# Patient Record
Sex: FEMALE | Employment: UNEMPLOYED | URBAN - METROPOLITAN AREA
[De-identification: names, ages, dates, MRNs, and addresses within clinical notes are randomized per-mention and may not be internally consistent; named-entity substitution may affect disease eponyms.]

---

## 2017-03-30 ENCOUNTER — DOCUMENTATION ONLY (OUTPATIENT)
Dept: FAMILY MEDICINE CLINIC | Age: 62
End: 2017-03-30

## 2018-01-01 ENCOUNTER — TELEPHONE (OUTPATIENT)
Dept: PALLATIVE CARE | Age: 63
End: 2018-01-01

## 2018-01-01 ENCOUNTER — HOSPITAL ENCOUNTER (INPATIENT)
Age: 63
LOS: 8 days | Discharge: NURSING, REHAB OR LONG-TERM CARE | DRG: 460 | End: 2018-11-20
Attending: EMERGENCY MEDICINE | Admitting: INTERNAL MEDICINE
Payer: MEDICAID

## 2018-01-01 VITALS
RESPIRATION RATE: 18 BRPM | OXYGEN SATURATION: 100 % | HEIGHT: 64 IN | WEIGHT: 139.99 LBS | BODY MASS INDEX: 23.9 KG/M2 | HEART RATE: 74 BPM | SYSTOLIC BLOOD PRESSURE: 148 MMHG | TEMPERATURE: 98.5 F | DIASTOLIC BLOOD PRESSURE: 62 MMHG

## 2018-01-01 DIAGNOSIS — N18.4 STAGE 4 CHRONIC KIDNEY DISEASE (HCC): ICD-10-CM

## 2018-01-01 DIAGNOSIS — Z71.89 GOALS OF CARE, COUNSELING/DISCUSSION: ICD-10-CM

## 2018-01-01 DIAGNOSIS — R53.81 DEBILITY: ICD-10-CM

## 2018-01-01 DIAGNOSIS — N17.9 ACUTE RENAL FAILURE, UNSPECIFIED ACUTE RENAL FAILURE TYPE (HCC): Primary | ICD-10-CM

## 2018-01-01 LAB
ALBUMIN SERPL ELPH-MCNC: 3.1 G/DL (ref 2.9–4.4)
ALBUMIN SERPL-MCNC: 2.7 G/DL (ref 3.5–5)
ALBUMIN SERPL-MCNC: 2.7 G/DL (ref 3.5–5)
ALBUMIN SERPL-MCNC: 2.8 G/DL (ref 3.5–5)
ALBUMIN SERPL-MCNC: 2.9 G/DL (ref 3.5–5)
ALBUMIN SERPL-MCNC: 3.1 G/DL (ref 3.5–5)
ALBUMIN/GLOB SERPL: 0.7 {RATIO} (ref 1.1–2.2)
ALBUMIN/GLOB SERPL: 0.8 {RATIO} (ref 1.1–2.2)
ALBUMIN/GLOB SERPL: 0.8 {RATIO} (ref 1.1–2.2)
ALBUMIN/GLOB SERPL: 1 {RATIO} (ref 0.7–1.7)
ALP SERPL-CCNC: 256 U/L (ref 45–117)
ALP SERPL-CCNC: 265 U/L (ref 45–117)
ALP SERPL-CCNC: 299 U/L (ref 45–117)
ALPHA1 GLOB SERPL ELPH-MCNC: 0.2 G/DL (ref 0–0.4)
ALPHA2 GLOB SERPL ELPH-MCNC: 0.8 G/DL (ref 0.4–1)
ALT SERPL-CCNC: 10 U/L (ref 12–78)
ALT SERPL-CCNC: 11 U/L (ref 12–78)
ALT SERPL-CCNC: 9 U/L (ref 12–78)
ANION GAP SERPL CALC-SCNC: 10 MMOL/L (ref 5–15)
ANION GAP SERPL CALC-SCNC: 5 MMOL/L (ref 5–15)
ANION GAP SERPL CALC-SCNC: 7 MMOL/L (ref 5–15)
ANION GAP SERPL CALC-SCNC: 9 MMOL/L (ref 5–15)
APPEARANCE UR: ABNORMAL
AST SERPL-CCNC: 11 U/L (ref 15–37)
AST SERPL-CCNC: 8 U/L (ref 15–37)
AST SERPL-CCNC: 9 U/L (ref 15–37)
B-GLOBULIN SERPL ELPH-MCNC: 0.8 G/DL (ref 0.7–1.3)
BACTERIA SPEC CULT: NORMAL
BACTERIA URNS QL MICRO: NEGATIVE /HPF
BASOPHILS # BLD: 0 K/UL (ref 0–0.1)
BASOPHILS # BLD: 0 K/UL (ref 0–0.1)
BASOPHILS NFR BLD: 0 % (ref 0–1)
BASOPHILS NFR BLD: 1 % (ref 0–1)
BILIRUB SERPL-MCNC: 0.3 MG/DL (ref 0.2–1)
BILIRUB SERPL-MCNC: 0.4 MG/DL (ref 0.2–1)
BILIRUB SERPL-MCNC: 0.4 MG/DL (ref 0.2–1)
BILIRUB UR QL: NEGATIVE
BUN SERPL-MCNC: 53 MG/DL (ref 6–20)
BUN SERPL-MCNC: 53 MG/DL (ref 6–20)
BUN SERPL-MCNC: 54 MG/DL (ref 6–20)
BUN SERPL-MCNC: 55 MG/DL (ref 6–20)
BUN SERPL-MCNC: 57 MG/DL (ref 6–20)
BUN SERPL-MCNC: 57 MG/DL (ref 6–20)
BUN SERPL-MCNC: 60 MG/DL (ref 6–20)
BUN/CREAT SERPL: 7 (ref 12–20)
BUN/CREAT SERPL: 8 (ref 12–20)
CALCIUM SERPL-MCNC: 7.2 MG/DL (ref 8.5–10.1)
CALCIUM SERPL-MCNC: 7.3 MG/DL (ref 8.5–10.1)
CALCIUM SERPL-MCNC: 7.6 MG/DL (ref 8.5–10.1)
CALCIUM SERPL-MCNC: 7.6 MG/DL (ref 8.5–10.1)
CALCIUM SERPL-MCNC: 8 MG/DL (ref 8.5–10.1)
CALCIUM SERPL-MCNC: 8.1 MG/DL (ref 8.5–10.1)
CALCIUM SERPL-MCNC: 8.2 MG/DL (ref 8.5–10.1)
CC UR VC: NORMAL
CHLORIDE SERPL-SCNC: 111 MMOL/L (ref 97–108)
CHLORIDE SERPL-SCNC: 111 MMOL/L (ref 97–108)
CHLORIDE SERPL-SCNC: 112 MMOL/L (ref 97–108)
CHLORIDE SERPL-SCNC: 113 MMOL/L (ref 97–108)
CHLORIDE SERPL-SCNC: 115 MMOL/L (ref 97–108)
CO2 SERPL-SCNC: 19 MMOL/L (ref 21–32)
CO2 SERPL-SCNC: 21 MMOL/L (ref 21–32)
CO2 SERPL-SCNC: 22 MMOL/L (ref 21–32)
CO2 SERPL-SCNC: 22 MMOL/L (ref 21–32)
CO2 SERPL-SCNC: 23 MMOL/L (ref 21–32)
CO2 SERPL-SCNC: 24 MMOL/L (ref 21–32)
CO2 SERPL-SCNC: 27 MMOL/L (ref 21–32)
COLOR UR: ABNORMAL
CREAT SERPL-MCNC: 6.69 MG/DL (ref 0.55–1.02)
CREAT SERPL-MCNC: 6.87 MG/DL (ref 0.55–1.02)
CREAT SERPL-MCNC: 7 MG/DL (ref 0.55–1.02)
CREAT SERPL-MCNC: 7.28 MG/DL (ref 0.55–1.02)
CREAT SERPL-MCNC: 7.37 MG/DL (ref 0.55–1.02)
CREAT SERPL-MCNC: 7.53 MG/DL (ref 0.55–1.02)
CREAT SERPL-MCNC: 7.54 MG/DL (ref 0.55–1.02)
DIFFERENTIAL METHOD BLD: ABNORMAL
DIFFERENTIAL METHOD BLD: ABNORMAL
EOSINOPHIL # BLD: 0 K/UL (ref 0–0.4)
EOSINOPHIL # BLD: 0.1 K/UL (ref 0–0.4)
EOSINOPHIL NFR BLD: 0 % (ref 0–7)
EOSINOPHIL NFR BLD: 2 % (ref 0–7)
EPITH CASTS URNS QL MICRO: ABNORMAL /LPF
ERYTHROCYTE [DISTWIDTH] IN BLOOD BY AUTOMATED COUNT: 13 % (ref 11.5–14.5)
ERYTHROCYTE [DISTWIDTH] IN BLOOD BY AUTOMATED COUNT: 13.2 % (ref 11.5–14.5)
ERYTHROCYTE [DISTWIDTH] IN BLOOD BY AUTOMATED COUNT: 13.2 % (ref 11.5–14.5)
ERYTHROCYTE [DISTWIDTH] IN BLOOD BY AUTOMATED COUNT: 13.4 % (ref 11.5–14.5)
GAMMA GLOB SERPL ELPH-MCNC: 1.3 G/DL (ref 0.4–1.8)
GLOBULIN SER CALC-MCNC: 3.1 G/DL (ref 2.2–3.9)
GLOBULIN SER CALC-MCNC: 3.4 G/DL (ref 2–4)
GLOBULIN SER CALC-MCNC: 3.8 G/DL (ref 2–4)
GLOBULIN SER CALC-MCNC: 4.1 G/DL (ref 2–4)
GLUCOSE BLD STRIP.AUTO-MCNC: 163 MG/DL (ref 65–100)
GLUCOSE SERPL-MCNC: 167 MG/DL (ref 65–100)
GLUCOSE SERPL-MCNC: 71 MG/DL (ref 65–100)
GLUCOSE SERPL-MCNC: 72 MG/DL (ref 65–100)
GLUCOSE SERPL-MCNC: 82 MG/DL (ref 65–100)
GLUCOSE SERPL-MCNC: 82 MG/DL (ref 65–100)
GLUCOSE SERPL-MCNC: 83 MG/DL (ref 65–100)
GLUCOSE SERPL-MCNC: 94 MG/DL (ref 65–100)
GLUCOSE UR STRIP.AUTO-MCNC: 100 MG/DL
HCT VFR BLD AUTO: 27.3 % (ref 35–47)
HCT VFR BLD AUTO: 28.1 % (ref 35–47)
HCT VFR BLD AUTO: 29.5 % (ref 35–47)
HCT VFR BLD AUTO: 30.4 % (ref 35–47)
HCT VFR BLD AUTO: 30.5 % (ref 35–47)
HCT VFR BLD AUTO: 34.1 % (ref 35–47)
HGB BLD-MCNC: 10.3 G/DL (ref 11.5–16)
HGB BLD-MCNC: 8.3 G/DL (ref 11.5–16)
HGB BLD-MCNC: 8.4 G/DL (ref 11.5–16)
HGB BLD-MCNC: 9 G/DL (ref 11.5–16)
HGB BLD-MCNC: 9.2 G/DL (ref 11.5–16)
HGB BLD-MCNC: 9.5 G/DL (ref 11.5–16)
HGB UR QL STRIP: NEGATIVE
HYALINE CASTS URNS QL MICRO: ABNORMAL /LPF (ref 0–5)
IMM GRANULOCYTES # BLD: 0 K/UL (ref 0–0.04)
IMM GRANULOCYTES # BLD: 0 K/UL (ref 0–0.04)
IMM GRANULOCYTES NFR BLD AUTO: 0 % (ref 0–0.5)
IMM GRANULOCYTES NFR BLD AUTO: 0 % (ref 0–0.5)
INR PPP: 1 (ref 0.9–1.1)
IRON SATN MFR SERPL: 23 % (ref 20–50)
IRON SERPL-MCNC: 52 UG/DL (ref 35–150)
KETONES UR QL STRIP.AUTO: NEGATIVE MG/DL
LACTATE SERPL-SCNC: 0.6 MMOL/L (ref 0.4–2)
LEUKOCYTE ESTERASE UR QL STRIP.AUTO: ABNORMAL
LIPASE SERPL-CCNC: 42 U/L (ref 73–393)
LYMPHOCYTES # BLD: 1.2 K/UL (ref 0.8–3.5)
LYMPHOCYTES # BLD: 2 K/UL (ref 0.8–3.5)
LYMPHOCYTES NFR BLD: 17 % (ref 12–49)
LYMPHOCYTES NFR BLD: 35 % (ref 12–49)
M PROTEIN SERPL ELPH-MCNC: NORMAL G/DL
MAGNESIUM SERPL-MCNC: 2 MG/DL (ref 1.6–2.4)
MAGNESIUM SERPL-MCNC: 2.1 MG/DL (ref 1.6–2.4)
MAGNESIUM SERPL-MCNC: 2.3 MG/DL (ref 1.6–2.4)
MCH RBC QN AUTO: 27.6 PG (ref 26–34)
MCH RBC QN AUTO: 27.8 PG (ref 26–34)
MCH RBC QN AUTO: 28 PG (ref 26–34)
MCH RBC QN AUTO: 28.1 PG (ref 26–34)
MCH RBC QN AUTO: 28.1 PG (ref 26–34)
MCH RBC QN AUTO: 28.4 PG (ref 26–34)
MCHC RBC AUTO-ENTMCNC: 29.9 G/DL (ref 30–36.5)
MCHC RBC AUTO-ENTMCNC: 30.2 G/DL (ref 30–36.5)
MCHC RBC AUTO-ENTMCNC: 30.3 G/DL (ref 30–36.5)
MCHC RBC AUTO-ENTMCNC: 30.4 G/DL (ref 30–36.5)
MCHC RBC AUTO-ENTMCNC: 30.5 G/DL (ref 30–36.5)
MCHC RBC AUTO-ENTMCNC: 31.1 G/DL (ref 30–36.5)
MCV RBC AUTO: 91.3 FL (ref 80–99)
MCV RBC AUTO: 91.6 FL (ref 80–99)
MCV RBC AUTO: 91.8 FL (ref 80–99)
MCV RBC AUTO: 92.4 FL (ref 80–99)
MCV RBC AUTO: 92.5 FL (ref 80–99)
MCV RBC AUTO: 92.9 FL (ref 80–99)
MONOCYTES # BLD: 0.4 K/UL (ref 0–1)
MONOCYTES # BLD: 0.5 K/UL (ref 0–1)
MONOCYTES NFR BLD: 6 % (ref 5–13)
MONOCYTES NFR BLD: 9 % (ref 5–13)
NEUTS SEG # BLD: 3.1 K/UL (ref 1.8–8)
NEUTS SEG # BLD: 5.4 K/UL (ref 1.8–8)
NEUTS SEG NFR BLD: 54 % (ref 32–75)
NEUTS SEG NFR BLD: 76 % (ref 32–75)
NITRITE UR QL STRIP.AUTO: NEGATIVE
NRBC # BLD: 0 K/UL (ref 0–0.01)
NRBC BLD-RTO: 0 PER 100 WBC
PH UR STRIP: 7.5 [PH] (ref 5–8)
PHOSPHATE SERPL-MCNC: 4.3 MG/DL (ref 2.6–4.7)
PHOSPHATE SERPL-MCNC: 4.3 MG/DL (ref 2.6–4.7)
PHOSPHATE SERPL-MCNC: 4.4 MG/DL (ref 2.6–4.7)
PHOSPHATE SERPL-MCNC: 4.6 MG/DL (ref 2.6–4.7)
PHOSPHATE SERPL-MCNC: 4.8 MG/DL (ref 2.6–4.7)
PHOSPHATE SERPL-MCNC: 5 MG/DL (ref 2.6–4.7)
PLATELET # BLD AUTO: 173 K/UL (ref 150–400)
PLATELET # BLD AUTO: 175 K/UL (ref 150–400)
PLATELET # BLD AUTO: 175 K/UL (ref 150–400)
PLATELET # BLD AUTO: 191 K/UL (ref 150–400)
PLATELET # BLD AUTO: 209 K/UL (ref 150–400)
PLATELET # BLD AUTO: 241 K/UL (ref 150–400)
PMV BLD AUTO: 11.8 FL (ref 8.9–12.9)
PMV BLD AUTO: 12 FL (ref 8.9–12.9)
PMV BLD AUTO: 12.1 FL (ref 8.9–12.9)
PMV BLD AUTO: 12.2 FL (ref 8.9–12.9)
PMV BLD AUTO: 12.5 FL (ref 8.9–12.9)
PMV BLD AUTO: 12.5 FL (ref 8.9–12.9)
POTASSIUM SERPL-SCNC: 4.3 MMOL/L (ref 3.5–5.1)
POTASSIUM SERPL-SCNC: 4.4 MMOL/L (ref 3.5–5.1)
POTASSIUM SERPL-SCNC: 4.5 MMOL/L (ref 3.5–5.1)
POTASSIUM SERPL-SCNC: 5.1 MMOL/L (ref 3.5–5.1)
POTASSIUM SERPL-SCNC: 5.1 MMOL/L (ref 3.5–5.1)
PROT SERPL-MCNC: 6.1 G/DL (ref 6.4–8.2)
PROT SERPL-MCNC: 6.2 G/DL (ref 6–8.5)
PROT SERPL-MCNC: 6.6 G/DL (ref 6.4–8.2)
PROT SERPL-MCNC: 7.2 G/DL (ref 6.4–8.2)
PROT UR STRIP-MCNC: 100 MG/DL
PROTHROMBIN TIME: 10.2 SEC (ref 9–11.1)
RBC # BLD AUTO: 2.95 M/UL (ref 3.8–5.2)
RBC # BLD AUTO: 3.04 M/UL (ref 3.8–5.2)
RBC # BLD AUTO: 3.22 M/UL (ref 3.8–5.2)
RBC # BLD AUTO: 3.31 M/UL (ref 3.8–5.2)
RBC # BLD AUTO: 3.34 M/UL (ref 3.8–5.2)
RBC # BLD AUTO: 3.67 M/UL (ref 3.8–5.2)
RBC #/AREA URNS HPF: ABNORMAL /HPF (ref 0–5)
SERVICE CMNT-IMP: ABNORMAL
SERVICE CMNT-IMP: NORMAL
SODIUM SERPL-SCNC: 141 MMOL/L (ref 136–145)
SODIUM SERPL-SCNC: 142 MMOL/L (ref 136–145)
SODIUM SERPL-SCNC: 143 MMOL/L (ref 136–145)
SODIUM SERPL-SCNC: 145 MMOL/L (ref 136–145)
SODIUM SERPL-SCNC: 147 MMOL/L (ref 136–145)
SP GR UR REFRACTOMETRY: 1.01 (ref 1–1.03)
TIBC SERPL-MCNC: 226 UG/DL (ref 250–450)
UA: UC IF INDICATED,UAUC: ABNORMAL
UROBILINOGEN UR QL STRIP.AUTO: 0.2 EU/DL (ref 0.2–1)
WBC # BLD AUTO: 5 K/UL (ref 3.6–11)
WBC # BLD AUTO: 5.4 K/UL (ref 3.6–11)
WBC # BLD AUTO: 5.8 K/UL (ref 3.6–11)
WBC # BLD AUTO: 6.8 K/UL (ref 3.6–11)
WBC # BLD AUTO: 7 K/UL (ref 3.6–11)
WBC # BLD AUTO: 7.8 K/UL (ref 3.6–11)
WBC URNS QL MICRO: ABNORMAL /HPF (ref 0–4)

## 2018-01-01 PROCEDURE — 97116 GAIT TRAINING THERAPY: CPT | Performed by: PHYSICAL THERAPIST

## 2018-01-01 PROCEDURE — 36415 COLL VENOUS BLD VENIPUNCTURE: CPT

## 2018-01-01 PROCEDURE — 80053 COMPREHEN METABOLIC PANEL: CPT

## 2018-01-01 PROCEDURE — G8978 MOBILITY CURRENT STATUS: HCPCS | Performed by: PHYSICAL THERAPIST

## 2018-01-01 PROCEDURE — 74011250637 HC RX REV CODE- 250/637: Performed by: INTERNAL MEDICINE

## 2018-01-01 PROCEDURE — 74011250636 HC RX REV CODE- 250/636: Performed by: INTERNAL MEDICINE

## 2018-01-01 PROCEDURE — G8987 SELF CARE CURRENT STATUS: HCPCS

## 2018-01-01 PROCEDURE — 97530 THERAPEUTIC ACTIVITIES: CPT | Performed by: PHYSICAL THERAPIST

## 2018-01-01 PROCEDURE — 84100 ASSAY OF PHOSPHORUS: CPT

## 2018-01-01 PROCEDURE — 74011250636 HC RX REV CODE- 250/636: Performed by: EMERGENCY MEDICINE

## 2018-01-01 PROCEDURE — 85027 COMPLETE CBC AUTOMATED: CPT

## 2018-01-01 PROCEDURE — 83735 ASSAY OF MAGNESIUM: CPT

## 2018-01-01 PROCEDURE — 65270000015 HC RM PRIVATE ONCOLOGY

## 2018-01-01 PROCEDURE — 87086 URINE CULTURE/COLONY COUNT: CPT

## 2018-01-01 PROCEDURE — 80069 RENAL FUNCTION PANEL: CPT

## 2018-01-01 PROCEDURE — G8979 MOBILITY GOAL STATUS: HCPCS | Performed by: PHYSICAL THERAPIST

## 2018-01-01 PROCEDURE — G8988 SELF CARE GOAL STATUS: HCPCS

## 2018-01-01 PROCEDURE — 94760 N-INVAS EAR/PLS OXIMETRY 1: CPT

## 2018-01-01 PROCEDURE — 97535 SELF CARE MNGMENT TRAINING: CPT | Performed by: OCCUPATIONAL THERAPIST

## 2018-01-01 PROCEDURE — 82962 GLUCOSE BLOOD TEST: CPT

## 2018-01-01 PROCEDURE — 76450000000

## 2018-01-01 PROCEDURE — 97535 SELF CARE MNGMENT TRAINING: CPT

## 2018-01-01 PROCEDURE — 85610 PROTHROMBIN TIME: CPT

## 2018-01-01 PROCEDURE — 83690 ASSAY OF LIPASE: CPT

## 2018-01-01 PROCEDURE — 97530 THERAPEUTIC ACTIVITIES: CPT | Performed by: OCCUPATIONAL THERAPIST

## 2018-01-01 PROCEDURE — 97161 PT EVAL LOW COMPLEX 20 MIN: CPT | Performed by: PHYSICAL THERAPIST

## 2018-01-01 PROCEDURE — 81001 URINALYSIS AUTO W/SCOPE: CPT

## 2018-01-01 PROCEDURE — 74011000258 HC RX REV CODE- 258: Performed by: INTERNAL MEDICINE

## 2018-01-01 PROCEDURE — 97110 THERAPEUTIC EXERCISES: CPT

## 2018-01-01 PROCEDURE — 99284 EMERGENCY DEPT VISIT MOD MDM: CPT

## 2018-01-01 PROCEDURE — 85025 COMPLETE CBC W/AUTO DIFF WBC: CPT

## 2018-01-01 PROCEDURE — 83540 ASSAY OF IRON: CPT

## 2018-01-01 PROCEDURE — 84165 PROTEIN E-PHORESIS SERUM: CPT

## 2018-01-01 PROCEDURE — 83605 ASSAY OF LACTIC ACID: CPT

## 2018-01-01 PROCEDURE — 97165 OT EVAL LOW COMPLEX 30 MIN: CPT

## 2018-01-01 RX ORDER — ACETAMINOPHEN 325 MG/1
650 TABLET ORAL
Status: DISCONTINUED | OUTPATIENT
Start: 2018-01-01 | End: 2018-01-01 | Stop reason: HOSPADM

## 2018-01-01 RX ORDER — HYDRALAZINE HYDROCHLORIDE 50 MG/1
50 TABLET, FILM COATED ORAL 2 TIMES DAILY
Status: DISCONTINUED | OUTPATIENT
Start: 2018-01-01 | End: 2018-01-01

## 2018-01-01 RX ORDER — ONDANSETRON 2 MG/ML
4 INJECTION INTRAMUSCULAR; INTRAVENOUS
Status: DISCONTINUED | OUTPATIENT
Start: 2018-01-01 | End: 2018-01-01 | Stop reason: HOSPADM

## 2018-01-01 RX ORDER — SODIUM CHLORIDE 0.9 % (FLUSH) 0.9 %
5-10 SYRINGE (ML) INJECTION AS NEEDED
Status: DISCONTINUED | OUTPATIENT
Start: 2018-01-01 | End: 2018-01-01 | Stop reason: HOSPADM

## 2018-01-01 RX ORDER — SODIUM CHLORIDE 450 MG/100ML
75 INJECTION, SOLUTION INTRAVENOUS CONTINUOUS
Status: DISCONTINUED | OUTPATIENT
Start: 2018-01-01 | End: 2018-01-01

## 2018-01-01 RX ORDER — AMLODIPINE BESYLATE 5 MG/1
5 TABLET ORAL ONCE
Status: COMPLETED | OUTPATIENT
Start: 2018-01-01 | End: 2018-01-01

## 2018-01-01 RX ORDER — CARVEDILOL 12.5 MG/1
12.5 TABLET ORAL 2 TIMES DAILY WITH MEALS
Status: DISCONTINUED | OUTPATIENT
Start: 2018-01-01 | End: 2018-01-01

## 2018-01-01 RX ORDER — AMLODIPINE BESYLATE 5 MG/1
5 TABLET ORAL DAILY
Status: DISCONTINUED | OUTPATIENT
Start: 2018-01-01 | End: 2018-01-01

## 2018-01-01 RX ORDER — AMLODIPINE BESYLATE 5 MG/1
10 TABLET ORAL DAILY
Status: DISCONTINUED | OUTPATIENT
Start: 2018-01-01 | End: 2018-01-01 | Stop reason: HOSPADM

## 2018-01-01 RX ORDER — HYDRALAZINE HYDROCHLORIDE 20 MG/ML
20 INJECTION INTRAMUSCULAR; INTRAVENOUS
Status: DISCONTINUED | OUTPATIENT
Start: 2018-01-01 | End: 2018-01-01 | Stop reason: HOSPADM

## 2018-01-01 RX ORDER — TRAMADOL HYDROCHLORIDE 50 MG/1
50 TABLET ORAL
Status: DISCONTINUED | OUTPATIENT
Start: 2018-01-01 | End: 2018-01-01 | Stop reason: HOSPADM

## 2018-01-01 RX ORDER — AMLODIPINE BESYLATE 10 MG/1
10 TABLET ORAL DAILY
Qty: 30 TAB | Refills: 0 | Status: SHIPPED
Start: 2018-01-01

## 2018-01-01 RX ORDER — SODIUM CHLORIDE 0.9 % (FLUSH) 0.9 %
5-10 SYRINGE (ML) INJECTION EVERY 8 HOURS
Status: DISCONTINUED | OUTPATIENT
Start: 2018-01-01 | End: 2018-01-01 | Stop reason: HOSPADM

## 2018-01-01 RX ORDER — CLONIDINE 0.2 MG/24H
1 PATCH, EXTENDED RELEASE TRANSDERMAL
Qty: 1 PATCH | Refills: 0 | Status: SHIPPED
Start: 2018-01-01 | End: 2019-01-01 | Stop reason: CLARIF

## 2018-01-01 RX ORDER — BISACODYL 5 MG
5 TABLET, DELAYED RELEASE (ENTERIC COATED) ORAL DAILY PRN
Status: DISCONTINUED | OUTPATIENT
Start: 2018-01-01 | End: 2018-01-01 | Stop reason: HOSPADM

## 2018-01-01 RX ORDER — CLONIDINE 0.2 MG/24H
1 PATCH, EXTENDED RELEASE TRANSDERMAL
Status: DISCONTINUED | OUTPATIENT
Start: 2018-01-01 | End: 2018-01-01 | Stop reason: HOSPADM

## 2018-01-01 RX ORDER — HEPARIN SODIUM 5000 [USP'U]/ML
5000 INJECTION, SOLUTION INTRAVENOUS; SUBCUTANEOUS EVERY 12 HOURS
Status: DISCONTINUED | OUTPATIENT
Start: 2018-01-01 | End: 2018-01-01 | Stop reason: HOSPADM

## 2018-01-01 RX ORDER — SODIUM CHLORIDE 9 MG/ML
125 INJECTION, SOLUTION INTRAVENOUS CONTINUOUS
Status: DISCONTINUED | OUTPATIENT
Start: 2018-01-01 | End: 2018-01-01

## 2018-01-01 RX ADMIN — AMLODIPINE BESYLATE 10 MG: 5 TABLET ORAL at 09:39

## 2018-01-01 RX ADMIN — SODIUM CHLORIDE 125 ML/HR: 900 INJECTION, SOLUTION INTRAVENOUS at 21:38

## 2018-01-01 RX ADMIN — Medication 10 ML: at 09:59

## 2018-01-01 RX ADMIN — HEPARIN SODIUM 5000 UNITS: 5000 INJECTION INTRAVENOUS; SUBCUTANEOUS at 21:46

## 2018-01-01 RX ADMIN — AMLODIPINE BESYLATE 5 MG: 5 TABLET ORAL at 09:06

## 2018-01-01 RX ADMIN — ERYTHROPOIETIN 10000 UNITS: 10000 INJECTION, SOLUTION INTRAVENOUS; SUBCUTANEOUS at 16:52

## 2018-01-01 RX ADMIN — SODIUM CHLORIDE 125 ML/HR: 900 INJECTION, SOLUTION INTRAVENOUS at 10:47

## 2018-01-01 RX ADMIN — Medication 10 ML: at 14:52

## 2018-01-01 RX ADMIN — HEPARIN SODIUM 5000 UNITS: 5000 INJECTION INTRAVENOUS; SUBCUTANEOUS at 09:09

## 2018-01-01 RX ADMIN — Medication 10 ML: at 14:22

## 2018-01-01 RX ADMIN — Medication 10 ML: at 21:53

## 2018-01-01 RX ADMIN — AMLODIPINE BESYLATE 5 MG: 5 TABLET ORAL at 14:52

## 2018-01-01 RX ADMIN — Medication 10 ML: at 09:08

## 2018-01-01 RX ADMIN — Medication 10 ML: at 15:21

## 2018-01-01 RX ADMIN — AMLODIPINE BESYLATE 5 MG: 5 TABLET ORAL at 12:59

## 2018-01-01 RX ADMIN — Medication 10 ML: at 06:00

## 2018-01-01 RX ADMIN — AMLODIPINE BESYLATE 10 MG: 5 TABLET ORAL at 09:09

## 2018-01-01 RX ADMIN — HEPARIN SODIUM 5000 UNITS: 5000 INJECTION INTRAVENOUS; SUBCUTANEOUS at 21:49

## 2018-01-01 RX ADMIN — Medication 10 ML: at 09:40

## 2018-01-01 RX ADMIN — Medication 10 ML: at 21:46

## 2018-01-01 RX ADMIN — AMLODIPINE BESYLATE 10 MG: 5 TABLET ORAL at 08:18

## 2018-01-01 RX ADMIN — Medication 10 ML: at 21:49

## 2018-01-01 RX ADMIN — Medication 10 ML: at 17:17

## 2018-01-01 RX ADMIN — AMLODIPINE BESYLATE 10 MG: 5 TABLET ORAL at 09:59

## 2018-01-01 RX ADMIN — HEPARIN SODIUM 5000 UNITS: 5000 INJECTION INTRAVENOUS; SUBCUTANEOUS at 09:06

## 2018-01-01 RX ADMIN — Medication 10 ML: at 16:53

## 2018-01-01 RX ADMIN — Medication 10 ML: at 21:17

## 2018-01-01 RX ADMIN — Medication 10 ML: at 21:05

## 2018-01-01 RX ADMIN — SODIUM CHLORIDE 500 ML: 900 INJECTION, SOLUTION INTRAVENOUS at 19:22

## 2018-01-01 RX ADMIN — ERYTHROPOIETIN 10000 UNITS: 10000 INJECTION, SOLUTION INTRAVENOUS; SUBCUTANEOUS at 17:53

## 2018-01-01 RX ADMIN — Medication 10 ML: at 12:50

## 2018-01-01 RX ADMIN — TRAMADOL HYDROCHLORIDE 50 MG: 50 TABLET, FILM COATED ORAL at 21:04

## 2018-01-01 RX ADMIN — ERYTHROPOIETIN 10000 UNITS: 10000 INJECTION, SOLUTION INTRAVENOUS; SUBCUTANEOUS at 17:17

## 2018-01-01 RX ADMIN — Medication 10 ML: at 08:54

## 2018-01-01 RX ADMIN — Medication 10 ML: at 07:01

## 2018-01-01 RX ADMIN — HEPARIN SODIUM 5000 UNITS: 5000 INJECTION INTRAVENOUS; SUBCUTANEOUS at 09:58

## 2018-01-01 RX ADMIN — SODIUM CHLORIDE 75 ML/HR: 450 INJECTION, SOLUTION INTRAVENOUS at 18:23

## 2018-01-01 RX ADMIN — Medication 10 ML: at 22:35

## 2018-01-01 RX ADMIN — ONDANSETRON 4 MG: 2 INJECTION INTRAMUSCULAR; INTRAVENOUS at 00:45

## 2018-01-01 RX ADMIN — Medication 10 ML: at 01:02

## 2018-03-27 ENCOUNTER — HOSPITAL ENCOUNTER (EMERGENCY)
Age: 63
Discharge: HOME | End: 2018-03-27
Attending: EMERGENCY MEDICINE
Payer: MEDICAID

## 2018-03-27 ENCOUNTER — APPOINTMENT (OUTPATIENT)
Dept: ULTRASOUND IMAGING | Age: 63
End: 2018-03-27
Attending: PHYSICIAN ASSISTANT
Payer: MEDICAID

## 2018-03-27 ENCOUNTER — APPOINTMENT (OUTPATIENT)
Dept: GENERAL RADIOLOGY | Age: 63
End: 2018-03-27
Attending: PHYSICIAN ASSISTANT
Payer: MEDICAID

## 2018-03-27 VITALS
TEMPERATURE: 98.3 F | OXYGEN SATURATION: 98 % | DIASTOLIC BLOOD PRESSURE: 91 MMHG | RESPIRATION RATE: 22 BRPM | BODY MASS INDEX: 25.78 KG/M2 | WEIGHT: 151.01 LBS | HEART RATE: 83 BPM | HEIGHT: 64 IN | SYSTOLIC BLOOD PRESSURE: 182 MMHG

## 2018-03-27 DIAGNOSIS — M48.10 DISH (DIFFUSE IDIOPATHIC SKELETAL HYPEROSTOSIS): Primary | ICD-10-CM

## 2018-03-27 DIAGNOSIS — M51.36 DDD (DEGENERATIVE DISC DISEASE), LUMBAR: ICD-10-CM

## 2018-03-27 DIAGNOSIS — N18.9 CHRONIC KIDNEY DISEASE, UNSPECIFIED CKD STAGE: ICD-10-CM

## 2018-03-27 DIAGNOSIS — M79.89 LEG SWELLING: ICD-10-CM

## 2018-03-27 LAB
ANION GAP BLD CALC-SCNC: 14 MMOL/L (ref 10–20)
BUN BLD-MCNC: 68 MG/DL (ref 9–20)
CA-I BLD-MCNC: 1.15 MMOL/L (ref 1.12–1.32)
CHLORIDE BLD-SCNC: 113 MMOL/L (ref 98–107)
CO2 BLD-SCNC: 22 MMOL/L (ref 21–32)
CREAT BLD-MCNC: 6.6 MG/DL (ref 0.6–1.3)
GLUCOSE BLD-MCNC: 74 MG/DL (ref 65–100)
HCT VFR BLD CALC: 31 % (ref 35–47)
POTASSIUM BLD-SCNC: 4.6 MMOL/L (ref 3.5–5.1)
SERVICE CMNT-IMP: ABNORMAL
SODIUM BLD-SCNC: 144 MMOL/L (ref 136–145)

## 2018-03-27 PROCEDURE — 80047 BASIC METABLC PNL IONIZED CA: CPT

## 2018-03-27 PROCEDURE — 73521 X-RAY EXAM HIPS BI 2 VIEWS: CPT

## 2018-03-27 PROCEDURE — 93970 EXTREMITY STUDY: CPT

## 2018-03-27 PROCEDURE — 99283 EMERGENCY DEPT VISIT LOW MDM: CPT

## 2018-03-27 RX ORDER — CYCLOBENZAPRINE HCL 10 MG
10 TABLET ORAL
Qty: 15 TAB | Refills: 0 | Status: SHIPPED | OUTPATIENT
Start: 2018-03-27 | End: 2018-04-19 | Stop reason: CLARIF

## 2018-03-27 RX ORDER — CARVEDILOL 12.5 MG/1
TABLET ORAL 2 TIMES DAILY WITH MEALS
COMMUNITY
End: 2018-01-01

## 2018-03-27 RX ORDER — BUMETANIDE 2 MG/1
2 TABLET ORAL DAILY
COMMUNITY
End: 2018-01-01

## 2018-03-27 NOTE — DISCHARGE INSTRUCTIONS
Learning About Degenerative Disc Disease  What is degenerative disc disease? Degenerative disc disease is not really a disease. It's a term used to describe the normal changes in your spinal discs as you age. Spinal discs are small, spongy discs that separate the bones (vertebrae) that make up the spine. The discs act as shock absorbers for the spine, so it can flex, bend, and twist.  Degenerative disc disease can take place in one or more places along the spine. It most often occurs in the discs in the lower back and the neck. What causes it? As we age, our spinal discs break down, or degenerate. This breakdown causes the symptoms of degenerative disc disease in some people. When the discs break down, they can lose fluid and dry out, and their outer layers can have tiny cracks or tears. This leads to less padding and less space between the bones in the spine. The body reacts to this by making bony growths on the spine called bone spurs. These spurs can press on the spinal nerve roots or spinal cord. This can cause pain and can affect how well the nerves work. What are the symptoms? Many people with degenerative disc disease have no pain. But others have severe pain or other symptoms that limit their activities. Some of the most common symptoms are:  · Pain in the back or neck. Where the pain occurs depends on which discs are affected. · Pain that gets worse when you move, such as bending over, reaching up, or twisting. · Pain that may occur in the rear end (buttocks), arm, or leg if a nerve is pinched. · Numbness or tingling in your arm or leg. How is it diagnosed? A doctor can often diagnose degenerative disc disease while doing a physical exam. If your exam shows no signs of a serious condition, imaging tests (such as an X-ray) aren't likely to help your doctor find the cause of your symptoms.   Sometimes degenerative disc disease is found when an X-ray is taken for another reason, such as an injury or other health problem. But even if the doctor finds degenerative disc disease, that doesn't always mean that you will have symptoms. How is it treated? There are several things you can do at home to manage pain from this problem. · To relieve pain, use ice or heat (whichever feels better) on the affected area. ¨ Put ice or a cold pack on the area for 10 to 20 minutes at a time. Put a thin cloth between the ice and your skin. ¨ Put a warm water bottle, a heating pad set on low, or a warm cloth on your back. Put a thin cloth between the heating pad and your skin. Do not go to sleep with a heating pad on your skin. · Ask your doctor if you can take acetaminophen (such as Tylenol) or nonsteroidal anti-inflammatory drugs, such as ibuprofen or naproxen. Your doctor can prescribe stronger medicines if needed. Be safe with medicines. Read and follow all instructions on the label. · Get some exercise every day. Exercise is one of the best ways to help your back feel better and stay better. It's best to start each exercise slowly. You may notice a little soreness, and that's okay. But if an exercise makes your pain worse, stop doing it. Here are things you can try:  ¨ Walking. It's the simplest and maybe the best activity for your back. It gets your blood moving and helps your muscles stay strong. ¨ Exercises that gently stretch and strengthen your stomach, back, and leg muscles. The stronger those muscles are, the better they're able to protect your back. If you have constant or severe pain in your back or spine, you may need other treatments, such as physical therapy. In some cases, your doctor may suggest surgery. Follow-up care is a key part of your treatment and safety. Be sure to make and go to all appointments, and call your doctor if you are having problems. It's also a good idea to know your test results and keep a list of the medicines you take. Where can you learn more?   Go to http://genie.info/. Enter M247 in the search box to learn more about \"Learning About Degenerative Disc Disease. \"  Current as of: March 21, 2017  Content Version: 11.4  © 5163-0650 [employer]. Care instructions adapted under license by [employer] (which disclaims liability or warranty for this information). If you have questions about a medical condition or this instruction, always ask your healthcare professional. [place] [address] any warranty or liability for your use of this information. Learning About Degenerative Disc Disease  What is degenerative disc disease? Degenerative disc disease is not really a disease. It's a term used to describe the normal changes in your spinal discs as you age. Spinal discs are small, spongy discs that separate the bones (vertebrae) that make up the spine. The discs act as shock absorbers for the spine, so it can flex, bend, and twist.  Degenerative disc disease can take place in one or more places along the spine. It most often occurs in the discs in the lower back and the neck. What causes it? As we age, our spinal discs break down, or degenerate. This breakdown causes the symptoms of degenerative disc disease in some people. When the discs break down, they can lose fluid and dry out, and their outer layers can have tiny cracks or tears. This leads to less padding and less space between the bones in the spine. The body reacts to this by making bony growths on the spine called bone spurs. These spurs can press on the spinal nerve roots or spinal cord. This can cause pain and can affect how well the nerves work. What are the symptoms? Many people with degenerative disc disease have no pain. But others have severe pain or other symptoms that limit their activities. Some of the most common symptoms are:  · Pain in the back or neck. Where the pain occurs depends on which discs are affected.   · Pain that gets worse when you move, such as bending over, reaching up, or twisting. · Pain that may occur in the rear end (buttocks), arm, or leg if a nerve is pinched. · Numbness or tingling in your arm or leg. How is it diagnosed? A doctor can often diagnose degenerative disc disease while doing a physical exam. If your exam shows no signs of a serious condition, imaging tests (such as an X-ray) aren't likely to help your doctor find the cause of your symptoms. Sometimes degenerative disc disease is found when an X-ray is taken for another reason, such as an injury or other health problem. But even if the doctor finds degenerative disc disease, that doesn't always mean that you will have symptoms. How is it treated? There are several things you can do at home to manage pain from this problem. · To relieve pain, use ice or heat (whichever feels better) on the affected area. ¨ Put ice or a cold pack on the area for 10 to 20 minutes at a time. Put a thin cloth between the ice and your skin. ¨ Put a warm water bottle, a heating pad set on low, or a warm cloth on your back. Put a thin cloth between the heating pad and your skin. Do not go to sleep with a heating pad on your skin. · Ask your doctor if you can take acetaminophen (such as Tylenol) or nonsteroidal anti-inflammatory drugs, such as ibuprofen or naproxen. Your doctor can prescribe stronger medicines if needed. Be safe with medicines. Read and follow all instructions on the label. · Get some exercise every day. Exercise is one of the best ways to help your back feel better and stay better. It's best to start each exercise slowly. You may notice a little soreness, and that's okay. But if an exercise makes your pain worse, stop doing it. Here are things you can try:  ¨ Walking. It's the simplest and maybe the best activity for your back. It gets your blood moving and helps your muscles stay strong.   ¨ Exercises that gently stretch and strengthen your stomach, back, and leg muscles. The stronger those muscles are, the better they're able to protect your back. If you have constant or severe pain in your back or spine, you may need other treatments, such as physical therapy. In some cases, your doctor may suggest surgery. Follow-up care is a key part of your treatment and safety. Be sure to make and go to all appointments, and call your doctor if you are having problems. It's also a good idea to know your test results and keep a list of the medicines you take. Where can you learn more? Go to http://terrySocial Collectivejulissa.info/. Enter F076 in the search box to learn more about \"Learning About Degenerative Disc Disease. \"  Current as of: March 21, 2017  Content Version: 11.5  © 1559-4591 [employer]. Care instructions adapted under license by [employer] (which disclaims liability or warranty for this information). If you have questions about a medical condition or this instruction, always ask your healthcare professional. [place] [address] any warranty or liability for your use of this information. Your Hemodialysis Access: Care Instructions  Your Care Instructions  Hemodialysis, or dialysis, is the use of a machine to remove wastes from your blood. You need it if your kidneys are not able to remove wastes on their own. A dialysis access is the place in your arm, or sometimes in your leg, where a doctor creates a blood vessel that carries a large flow of blood. When you have dialysis, two needles are placed in this blood vessel and are connected to the dialysis machine. Your blood flows out of one needle and into the machine to be cleaned. Then your cleaned blood flows back into your body through the other needle. Sometimes, a doctor makes a short-term access through a tube, called a catheter, placed in your neck, upper chest, or groin. Your doctor creates an access during a minor surgery.  You need to take care of your access to keep it working and to prevent infection. Follow-up care is a key part of your treatment and safety. Be sure to make and go to all appointments, and call your doctor if you are having problems. It's also a good idea to know your test results and keep a list of the medicines you take. How can you care for yourself at home? · After your doctor creates an access, keep it dry for at least 2 days. · Squeeze a soft ball or other object as instructed after the access is placed. This will help blood flow through the access and help prevent blood clots. · After you have dialysis, check to see whether the access bleeds or swells. Let your doctor know if your arm bleeds or swells. · Do not lift anything heavy with the arm that has the access. · Do not bump your arm. · Do not wear tight clothing or jewelry over the access. · Do not sleep with your access arm under your body. · Have blood drawn or blood pressure taken from your other arm. · Keep the access clean and dry. · Do not put cream or lotion on or near the access. When should you call for help? Call your doctor now or seek immediate medical care if:  ? · You have signs of infection, such as:  ¨ Increased pain, swelling, warmth, or redness around the access. ¨ Red streaks leading from the access. ¨ Pus draining from the access. ¨ A fever. ? · You do not feel a pulse in your access. ? Watch closely for changes in your health, and be sure to contact your doctor if:  ? · You do not get better as expected. Where can you learn more? Go to http://terry-julissa.info/. Enter L169 in the search box to learn more about \"Your Hemodialysis Access: Care Instructions. \"  Current as of: May 12, 2017  Content Version: 11.4  © 6540-4753 [employer]. Care instructions adapted under license by [employer] (which disclaims liability or warranty for this information).  If you have questions about a medical condition or this instruction, always ask your healthcare professional. [address] any warranty or liability for your use of this information. Diet for Chronic Kidney Disease (Before Dialysis): Care Instructions  Your Care Instructions  When you have chronic kidney disease, you need to change your diet to avoid foods that make your kidneys worse. You may need to limit salt, fluids, and protein. You also may need to limit minerals such as potassium and phosphorus. A diet for chronic kidney disease takes planning. A dietitian who specializes in kidney disease can help you plan meals that meet your needs. These guidelines are for people who are not on dialysis. Talk with your doctor or dietitian to make sure your diet is right for your condition. Do not change your diet without talking to your doctor or dietitian. Follow-up care is a key part of your treatment and safety. Be sure to make and go to all appointments, and call your doctor if you are having problems. It's also a good idea to know your test results and keep a list of the medicines you take. How can you care for yourself at home? · Work with your doctor or dietitian to create a food plan. · Do not skip meals or go for many hours without eating. If you do not feel very hungry, try to eat 4 or 5 small meals instead of 1 or 2 big meals. · If you have a hard time eating enough, talk to your doctor or dietitian about ways you can add calories to your diet. · Do not take any vitamins or minerals, supplements, or herbal products without talking to your doctor first.  · Check with your doctor about whether it is safe for you to drink alcohol. To get the right amount of protein  · Ask your doctor or dietitian how much protein you can have each day. Most people with chronic kidney disease need to limit the amount of protein they eat. But you still need some protein to stay healthy.   · Include all sources of protein in your daily protein count. Besides meat, poultry, fish, and eggs, protein is found in milk and milk products, beans and nuts, breads, cereals, and vegetables. To limit salt  · Read food labels on cans and food packages. The labels tell you how much sodium is in each serving. Make sure that you look at the serving size. If you eat more than the serving size, you will get more sodium than what is listed on the label. · Do not add salt to your food. Avoid foods that list salt, sodium, or monosodium glutamate (MSG) as an ingredient. · Buy foods that are labeled \"no salt added,\" \"sodium-free,\" or \"low-sodium. \" Foods labeled \"reduced-sodium\" and \"light sodium\" may still have too much sodium. · Limit processed foods, fast food, and restaurant foods. These types of food are very high in sodium. · Avoid salted pretzels, chips, popcorn, and other salted snacks. · Avoid smoked, cured, salted, and canned meat, fish, and poultry. This includes ham, schultz, hot dogs, and luncheon meats. · You may use lemon, herbs, and spices to flavor your meals. To limit potassium  · Choose low-potassium fruits such as applesauce, pineapple, grapes, blueberries, and raspberries. · Choose low-potassium vegetables such as lettuce, green beans, cucumber, and radishes. · Choose low-potassium foods such as hummus, spaghetti, macaroni, rice, tortillas, and bagels. · Limit or avoid high-potassium foods such as milk, bananas, oranges, cantaloupe, potatoes, spinach, tomatoes, broccoli, and sweet potatoes. · Do not use a salt substitute or lite salt unless your doctor says it is okay. Most salt substitutes and lite salts are high in potassium. To limit phosphorus  · Follow your food plan to know how much milk and milk products you can have. · Limit nuts, peanut butter, seeds, lentils, beans, organ meats, and sardines. · Avoid cola drinks. · Avoid bran breads or bran cereals. If you need to limit fluids  · Know how much fluid you can drink. Every day fill a pitcher with that amount of water. If you drink another fluid (such as coffee) that day, pour an equal amount of water out of the pitcher. · Count foods that are liquid at room temperature, such as gelatin dessert and ice cream, as fluids. Where can you learn more? Go to http://terry-julissa.info/. Enter H956 in the search box to learn more about \"Diet for Chronic Kidney Disease (Before Dialysis): Care Instructions. \"  Current as of: May 12, 2017  Content Version: 11.4  © 0010-2444 [employer]. Care instructions adapted under license by [employer] (which disclaims liability or warranty for this information). If you have questions about a medical condition or this instruction, always ask your healthcare professional. [place] [address] any warranty or liability for your use of this information. When You Decide Not to Treat Chronic Kidney Disease: Care Instructions  Your Care Instructions    Chronic kidney disease means that your kidneys can no longer do their jobs well. They can't remove waste from your blood. And they aren't able to balance your body's fluids and chemicals. When the kidneys fail, many people choose dialysis or a kidney transplant. These treatments can help them live better and longer. But other people decide that they do not want these treatments. In some cases, other health problems mean you wouldn't do well with dialysis or a kidney transplant. You may also decide that you are tired of treatments and you just want to be comfortable. You have the right to choose what is best for you. If you decide you don't want dialysis or a transplant, you can still work with your doctor to take medicine to control symptoms. Follow-up care is a key part of your treatment and safety. Be sure to make and go to all appointments, and call your doctor if you are having problems.  It's also a good idea to know your test results and keep a list of the medicines you take. How can you care for yourself at home? · Talk with your doctor. Be open and honest. This is the best way to understand the decisions you will need to make as your health changes. Know that you can always change your mind. · If you think you are depressed, ask your doctor for help. Depression can make it hard to think clearly and make decisions. Treatment can help. · Be safe with medicines. Take your medicines exactly as prescribed. Call your doctor if you have any problems with your medicine. · Get support from family, friends, and your doctor. You also may want to join a support group. Or you may want to talk to a [facility] or other counselor. · If you have an advance directive, let your doctor know. It may include a living will and a durable power of  for health care. If you don't have one, you may want to prepare one. It lets your doctor and loved ones know your health care wishes if you become unable to speak for yourself. · Ask your doctor if hospice care may help you. A hospice provides medical treatment, emotional help, and [facility] support when you are nearing the end of your life. When should you call for help? Watch closely for changes in your health, and be sure to contact your doctor if you have any problems. Where can you learn more? Go to http://terry-julissa.info/. Enter V170 in the search box to learn more about \"When You Decide Not to Treat Chronic Kidney Disease: Care Instructions. \"  Current as of: May 12, 2017  Content Version: 11.4  © 4411-9505 Healthwise, Incorporated. Care instructions adapted under license by [employer] (which disclaims liability or warranty for this information). If you have questions about a medical condition or this instruction, always ask your healthcare professional. [address] any warranty or liability for your use of this information.

## 2018-03-27 NOTE — ED PROVIDER NOTES
EMERGENCY DEPARTMENT HISTORY AND PHYSICAL EXAM      Date: 3/27/2018  Patient Name: Keara Pickering    History of Presenting Illness     Chief Complaint   Patient presents with    Hip Pain     Via w/c with friend and DIL w/ c/o of bilateral hip pain for \"forever\" that has worsened lately. History Provided By: Patient and Patient's Friend    HPI: Keara Pickering, 60 y.o. female with PMHx significant for CKD, HTN, DM, presents via wheelchair to the ED with cc of chronic BL hip pain since 09/2017. She reports movement exacerbates her pain but she is able to bear weight. She reports an increase in a sedentary lifestyle, but has been ambulating with a walker around her house on her own. Pt has taken Tylenol with no significant relief. Friend states the pt was in a nursing home in Sept/2017 but had removed her from the nursing home x 6 weeks ago after not feeling the pt had proper care. The MD at the nursing home would not rx the pt a stronger analgesic due to pt's h/o kidney failure, to which she has not had dialysis. Pt also reports BL leg swelling and states she is currently on a fluid pill. She notes a h/o kidney failure to which she has an appointment with her nephrologist on 3/30. She further states she avoids dialysis willingly, but confirms she is producing urine. She has been told for years that she needs to be on dialysis and has a fistula present in her right arm, but does not want to go. She denies CP, SOB, HA, inability to ambulate, numbness, tingling. PCP: Niurka Martinez, NP    There are no other complaints, changes, or physical findings at this time. Current Outpatient Prescriptions   Medication Sig Dispense Refill    bumetanide (BUMEX) 2 mg tablet Take 2 mg by mouth daily.  carvedilol (COREG) 12.5 mg tablet Take  by mouth two (2) times daily (with meals).  cyclobenzaprine (FLEXERIL) 10 mg tablet Take 1 Tab by mouth three (3) times daily as needed for Muscle Spasm(s). Indications: Muscle Spasm 15 Tab 0       Past History     Past Medical History:  Past Medical History:   Diagnosis Date    Chronic kidney disease 2014    Dr. Karyle Short stage 5    [facility]     Diabetic neuropathy [facility]     Diabetic retinopathy ([facility]     HTN (hypertension)     Legally blind        Past Surgical History:  Past Surgical History:   Procedure Laterality Date    HX GYN      tubal ligation    VASCULAR SURGERY PROCEDURE UNLIST  4/6/16    Creation of AV fistula right arm       Family History:  Family History   Problem Relation Age of Onset    Stroke Mother     Hypertension Mother     Diabetes Father     Cancer Sister     Stroke Sister     Diabetes Brother      2 brothers    Diabetes Paternal Grandmother        Social History:  Social History   Substance Use Topics    Smoking status: Never Smoker    Smokeless tobacco: Never Used    Alcohol use No       Allergies: Allergies   Allergen Reactions    Demerol [Meperidine] Swelling         Review of Systems   Review of Systems   Constitutional: Negative. Negative for activity change, appetite change, chills, diaphoresis, fever and unexpected weight change. HENT: Negative for congestion, hearing loss, rhinorrhea, sinus pressure, sneezing, sore throat and trouble swallowing. Eyes: Negative for pain, redness, itching and visual disturbance. Respiratory: Negative for cough, shortness of breath and wheezing. Cardiovascular: Positive for leg swelling. Negative for chest pain and palpitations. Gastrointestinal: Negative for abdominal pain, constipation, diarrhea, nausea and vomiting. Genitourinary: Negative for dysuria. Musculoskeletal: Positive for arthralgias (+BL hip). Negative for gait problem and myalgias. Skin: Negative for color change, pallor, rash and wound. Neurological: Negative for tremors, weakness, light-headedness, numbness and headaches. All other systems reviewed and are negative.       Physical Exam   Physical Exam   Constitutional: She is oriented to person, place, and time. Vital signs are normal. She appears well-developed and well-nourished. No distress. 60 y.o. female in NAD  Communicates appropriately and in full sentences  Normal vital signs except for an elevated BP   HENT:   Head: Normocephalic and atraumatic. Eyes: Conjunctivae are normal. Pupils are equal, round, and reactive to light. Right eye exhibits no discharge. Left eye exhibits no discharge. B/L blindness   Neck: Normal range of motion. Neck supple. No nuchal rigidity   Cardiovascular: Normal rate, regular rhythm and intact distal pulses. Pulmonary/Chest: Effort normal and breath sounds normal. No respiratory distress. She has no wheezes. Abdominal: Soft. Bowel sounds are normal. She exhibits no distension. There is no tenderness. Musculoskeletal: Normal range of motion. She exhibits edema and tenderness. She exhibits no deformity. No neurologic, motor, vascular, or compartment embarrassment observed on exam. No focal neurologic deficits. BL 1+ pitting edema, compression stockings in place, L hip joint tenderness   Neurological: She is alert and oriented to person, place, and time. Coordination normal.   Skin: Skin is warm and dry. No rash noted. She is not diaphoretic. No erythema. No pallor. Psychiatric: She has a normal mood and affect. Nursing note and vitals reviewed.       Diagnostic Study Results     Labs -   Recent Results (from the past 12 hour(s))   POC CHEM8    Collection Time: 03/27/18 12:53 PM   Result Value Ref Range    Calcium, ionized (POC) 1.15 1.12 - 1.32 mmol/L    Sodium (POC) 144 136 - 145 mmol/L    Potassium (POC) 4.6 3.5 - 5.1 mmol/L    Chloride (POC) 113 (H) 98 - 107 mmol/L    CO2 (POC) 22 21 - 32 mmol/L    Anion gap (POC) 14 10 - 20 mmol/L    Glucose (POC) 74 65 - 100 mg/dL    BUN (POC) 68 (H) 9 - 20 mg/dL    Creatinine (POC) 6.6 (H) 0.6 - 1.3 mg/dL    GFRAA, POC 8 (L) >60 ml/min/1.73m2    GFRNA, POC 6 (L) >60 ml/min/1.73m2    Hematocrit (POC) 31 (L) 35.0 - 47.0 %    Comment Comment Not Indicated. Radiologic Studies -   DUPLEX LOWER EXT VENOUS BILAT   Final Result   INDICATION: Bilateral Leg swelling, pain, DVT suspected      Exam: Duplex Doppler sonography of right and left lower extremities was  performed from the groin to the calf.      Findings: The right and left common femoral, femoral and popliteal veins are  compressible with normal color-flow and wave forms and response to physiologic  maneuvers including Valsalva and augmentation.     IMPRESSION  IMPRESSION: No evidence of DVT. XR HIPS BI W AP PELV   Final Result   EXAM:  XR HIPS BI W AP PELV     INDICATION:   bilateral hip pain x months.     COMPARISON: None.     FINDINGS: An AP view of the pelvis and a nonstandard frogleg lateral views of  both hips demonstrate no fracture, dislocation or other acute abnormality. There  appears to be degenerative disc disease in the mid lumbar spine, not  specifically evaluated. Vascular calcification is seen. Minimal DISH is seen.     IMPRESSION  IMPRESSION:  No acute abnormality       Medical Decision Making   I am the first provider for this patient. I reviewed the vital signs, available nursing notes, past medical history, past surgical history, family history and social history. Vital Signs-Reviewed the patient's vital signs. Patient Vitals for the past 12 hrs:   Temp Pulse Resp BP SpO2   03/27/18 1145 98.3 °F (36.8 °C) 83 22 (!) 182/91 98 %     Records Reviewed: Nursing Notes and Old Medical Records    Provider Notes (Medical Decision Making):   DDx: Arthritis, contusion, sprain, strain, DVT, questionable kidney failure, noncompliance with medical treatment    59 yo presents with B/L hip pain ongoing since Sept 2017. Of note, pt and friend also state that patient has been told that she needs to go on dialysis \"for years\" but pt has not yet done so because she does not want to.  They are here for \"second opinion\" to see if patient needs dialysis. Pt also has bilateral leg swelling. Will evaluate with XR bilateral hips, duplex, and POC chem8. Pt has appointment with nephrologist in [facility] on Friday. Reviewed results with patient and friends. Urged close follow-up with PCP and nephrologist, as well as orthopedist, as pt may need physical therapy in the future. Pt expresses understanding. Provided customary ED return precautions. ED Course:   Initial assessment performed. The patients presenting problems have been discussed, and they are in agreement with the care plan formulated and outlined with them. I have encouraged them to ask questions as they arise throughout their visit. Disposition:  DISCHARGE NOTE  1:40 PM  The patient has been re-evaluated and is ready for discharge. Reviewed available results with patient. Counseled patient on diagnosis and care plan. Patient has expressed understanding, and all questions have been answered. Patient agrees with plan and agrees to follow up as recommended, or return to the ED if their symptoms worsen. Discharge instructions have been provided and explained to the patient, along with reasons to return to the ED. PLAN:  1. Discharge  Current Discharge Medication List      START taking these medications    Details   cyclobenzaprine (FLEXERIL) 10 mg tablet Take 1 Tab by mouth three (3) times daily as needed for Muscle Spasm(s). Indications: Muscle Spasm  Qty: 15 Tab, Refills: 0           2.    Follow-up Information     Follow up With Details Comments Contact Info    Princess Harris NP Schedule an appointment as soon as possible for a visit in 2 days If symptoms worsen, As needed, Possible further evaluation and treatment Zena At [address] [place] [address]  [phone]      [facility] EMERGENCY DEPT Go to As needed, If symptoms worsen [address] [facility] [address] Lori Larsen MD Call today  [phone]  [address]  [facility]  [phone]      Angelica Dominguez MD Call today  [address]  [address]  [facility]  [phone]          Return to ED if worse     Diagnosis     Clinical Impression:   1. DISH (diffuse idiopathic skeletal hyperostosis)    2. Chronic kidney disease, unspecified CKD stage    3. DDD (degenerative disc disease), lumbar    4. Leg swelling        Attestations: This note is prepared by Presley Zhong, acting as Scribe for Jonathan Garcia PA-C. Jonathan Garcia PA-C: The scribe's documentation has been prepared under my direction and personally reviewed by me in its entirety. I confirm that the note above accurately reflects all work, treatment, procedures, and medical decision making performed by me. This note will not be viewable in [address]

## 2018-04-16 ENCOUNTER — OFFICE VISIT (OUTPATIENT)
Dept: SURGERY | Age: 63
End: 2018-04-16

## 2018-04-16 ENCOUNTER — HOSPITAL ENCOUNTER (OUTPATIENT)
Dept: NON INVASIVE DIAGNOSTICS | Age: 63
Discharge: HOME | End: 2018-04-16
Payer: MEDICAID

## 2018-04-16 VITALS
DIASTOLIC BLOOD PRESSURE: 101 MMHG | RESPIRATION RATE: 16 BRPM | BODY MASS INDEX: 24.01 KG/M2 | TEMPERATURE: 98.6 F | OXYGEN SATURATION: 100 % | HEIGHT: 64 IN | HEART RATE: 95 BPM | WEIGHT: 140.6 LBS | SYSTOLIC BLOOD PRESSURE: 213 MMHG

## 2018-04-16 DIAGNOSIS — N18.9 CHRONIC KIDNEY DISEASE, UNSPECIFIED CKD STAGE: ICD-10-CM

## 2018-04-16 DIAGNOSIS — N18.9 CHRONIC KIDNEY DISEASE, UNSPECIFIED CKD STAGE: Primary | ICD-10-CM

## 2018-04-16 LAB
ATRIAL RATE: 98 BPM
CALCULATED P AXIS, ECG09: 83 DEGREES
CALCULATED R AXIS, ECG10: 70 DEGREES
CALCULATED T AXIS, ECG11: 94 DEGREES
DIAGNOSIS, 93000: NORMAL
P-R INTERVAL, ECG05: 148 MS
Q-T INTERVAL, ECG07: 374 MS
QRS DURATION, ECG06: 80 MS
QTC CALCULATION (BEZET), ECG08: 477 MS
VENTRICULAR RATE, ECG03: 98 BPM

## 2018-04-16 PROCEDURE — 93005 ELECTROCARDIOGRAM TRACING: CPT

## 2018-04-16 RX ORDER — CALCITRIOL 0.25 UG/1
0.25 CAPSULE ORAL
COMMUNITY
End: 2018-04-19 | Stop reason: CLARIF

## 2018-04-16 RX ORDER — HYDRALAZINE HYDROCHLORIDE 50 MG/1
50 TABLET, FILM COATED ORAL
COMMUNITY
End: 2018-01-01

## 2018-04-16 NOTE — PROGRESS NOTES
Chelo Toribio is a 60 y.o. female  Chief Complaint   Patient presents with    Other     AVF right arm f/u     1. Have you been to the ER, urgent care clinic since your last visit? Hospitalized since your last visit? Yes, [address] [address], 3/2018, Pain in joiints    2. Have you seen or consulted any other health care providers outside of the [address] [place] [address] since your last visit? Include any pap smears or colon screening.  No

## 2018-04-16 NOTE — MR AVS SNAPSHOT
[address], [address] [facility], [address] [facility] 
[phone] Patient: Fela Post MRN: [id number] Visit Information Date & Time Provider Department Dept. Phone Encounter #  
 4/16/2018  8:20 AM Jocelyn Hogan MD [facility] [address] [phone] [id number] Upcoming Health Maintenance Date Due  
 FOOT EXAM Q1 7/23/1964 MICROALBUMIN Q1 7/23/1964 EYE EXAM RETINAL OR DILATED Q1 7/23/1964 Pneumococcal 19-64 Medium Risk (1 of 1 - PPSV23) 7/23/1973 DTaP/Tdap/Td series (1 - Tdap) 7/23/1975 PAP AKA CERVICAL CYTOLOGY 7/23/1975 BREAST CANCER SCRN MAMMOGRAM 7/23/2004 FOBT Q 1 YEAR AGE 50-75 7/23/2004 ZOSTER VACCINE AGE 60> 5/23/2014 HEMOGLOBIN A1C Q6M 2/19/2017 LIPID PANEL Q1 3/9/2017 Influenza Age 5 to Adult 8/1/2017 Allergies as of 4/16/2018  Review Complete On: 4/16/2018 By: Jackye Bloch, LPN Severity Noted Reaction Type Reactions Demerol [Meperidine] High 10/08/2015    Swelling Other reaction(s): edema Current Immunizations  Never Reviewed No immunizations on file. Not reviewed this visit You Were Diagnosed With   
  
 Codes Comments Chronic kidney disease, unspecified CKD stage    -  Primary ICD-10-CM: N18.9 ICD-9-CM: 618. 9 Vitals BP Pulse Temp Resp Height(growth percentile) Weight(growth percentile) (!) 213/101 (BP 1 Location: Left arm, BP Patient Position: Sitting) 95 98.6 °F (37 °C) (Oral) 16 5' 4\" (1.626 m) 140 lb 9.6 oz (63.8 kg) SpO2 BMI OB Status Smoking Status 100% 24.13 kg/m2 Postmenopausal Never Smoker Vitals History BMI and BSA Data Body Mass Index Body Surface Area  
 24.13 kg/m 2 1.7 m 2 Preferred Pharmacy Pharmacy Name Phone [facility] [address] [facility], [address] [facility] [phone] Your Updated Medication List  
  
   
 This list is accurate as of 4/16/18 10:24 AM.  Always use your most recent med list.  
  
  
  
  
 bumetanide 2 mg tablet Commonly known as:  Rossana Cristian Take 2 mg by mouth daily. calcitRIOL 0.25 mcg capsule Commonly known as:  ROCALTROL Take 0.25 mcg by mouth. carvedilol 12.5 mg tablet Commonly known as:  Orlando Hirschfeld Take  by mouth two (2) times daily (with meals). cyclobenzaprine 10 mg tablet Commonly known as:  FLEXERIL Take 1 Tab by mouth three (3) times daily as needed for Muscle Spasm(s). Indications: Muscle Spasm  
  
 hydrALAZINE 50 mg tablet Commonly known as:  APRESOLINE Take 50 mg by mouth. To-Do List   
 04/16/2018 ECG:  EKG, 12 LEAD, INITIAL Introducing [facility] SERVICES! [facility] introduces ePub Direct patient portal. Now you can access parts of your medical record, email your doctor's office, and request medication refills online. 1. In your internet browser, go to [email or web address] 2. Click on the First Time User? Click Here link in the Sign In box. You will see the New Member Sign Up page. 3. Enter your ePub Direct Access Code exactly as it appears below. You will not need to use this code after youve completed the sign-up process. If you do not sign up before the expiration date, you must request a new code. · ePub Direct Access Code: [email or web address] Expires: 6/25/2018  1:40 PM 
 
4. Enter the last four digits of your Social Security Number (xxxx) and Date of Birth (mm/dd/yyyy) as indicated and click Submit. You will be taken to the next sign-up page. 5. Create a ePub Direct ID. This will be your ePub Direct login ID and cannot be changed, so think of one that is secure and easy to remember. 6. Create a ePub Direct password. You can change your password at any time. 7. Enter your Password Reset Question and Answer. This can be used at a later time if you forget your password. 8. Enter your e-mail address. You will receive e-mail notification when new information is available in [address] 9. Click Sign Up. You can now view and download portions of your medical record. 10. Click the Download Summary menu link to download a portable copy of your medical information. If you have questions, please visit the Frequently Asked Questions section of the [employer] website. Remember, [employer] is NOT to be used for urgent needs. For medical emergencies, dial 911. Now available from your iPhone and Android! Please provide this summary of care documentation to your next provider. Your primary care clinician is listed as Mary Farrell. If you have any questions after today's visit, please call [phone].

## 2018-04-18 NOTE — PROGRESS NOTES
The patient is a 60 y.o. woman who in 2016 had creation of an arteriovenous fistula in the right arm. She was seen shortly after surgery, but did not further follow up. The patient is not presently requiring dialysis, but does have CKD 5 and likely soon will require dialysis. The patient has a history of diabetes mellitus, peripheral neuropathy, retinopathy, hypertension. The patient has never smoked. She does not use alcohol. The patient denies anginal chest pain, irregular heart beat, shortness of breath at rest or with exertion. Her appetite has decreased recently. Physical Examination:   GENERAL:  Alert woman in no acute distress. Visit Vitals    BP (!) 213/101 (BP 1 Location: Left arm, BP Patient Position: Sitting)    Pulse 95    Temp 98.6 °F (37 °C) (Oral)    Resp 16    Ht 5' 4\" (1.626 m)    Wt 63.8 kg (140 lb 9.6 oz)    SpO2 100%    BMI 24.13 kg/m2   HEAD/NECK:  No jaundice, mass or thyromegaly. LUNGS:  Clear bilaterally without wheeze, rhonchi or rales. Lavada Saucer HEART:  Regular without murmur, gallop or rub. EXTREMITIES: Examination of right upper extremity revealed palpable radial and ulnar pulse. There was a scar just below the right antecubital. No thrill was palpated. There was no bruit present. There is no arm edema. Duplex ultrasound evaluation was carried out of the right upper extremity. The site of the arteriovenous anastomosis was inspected. The anastomosis was patent with a stub of vein about 1 cm long being noted. Above that level, the vein is occluded. The outflow vein which ran into the cephalic system is occluded. There was no significant basilic vein in the distal left upper arm. Looking into the very proximal right upper arm, there is a 5.3 mm basilic vein noted to be patent very proximally. I spoke with the patient's nephrologist, Dr. Cherise Crum, by phone regarding her situation. Veins by ultrasound were fairly small on the left.  I think the best opportunity for access would be insertion of an arteriovenous graft in the right upper extremity. I reviewed with the patient the typical operative and post operative course for the surgery. Risks versus benefits were reviewed with the patient. Risks of surgery include bleeding, infection, failure of the graft, ischemia of the hand, swelling of the arm, injury to vessels or nerves, risk of anesthesia, etc. The patient understands and wishes to proceed. Final Diagnosis:  CKD 5. MedDATA/gwo     cc: Ric Mansfield MD       Total Evaluation and Management time utilized (excluding any procedure time)  was 30 minutes, with 55 % in counseling and/or coordination of care.     Bonita Figueroa MD

## 2018-04-23 ENCOUNTER — TELEPHONE (OUTPATIENT)
Dept: SURGERY | Age: 63
End: 2018-04-23

## 2018-04-23 NOTE — TELEPHONE ENCOUNTER
Spoke with Mauri Hill. She states Ms Viridiana Blandon has changed her mind about having surgery & does not plan to do dialysis. Also spoke with Veronika at dialysis to give message to Dr Trevor Garcia.

## 2018-04-23 NOTE — TELEPHONE ENCOUNTER
Patient's daughter in law calling to cancel tomorrow's surgery. Patient changed her mind, does not want surgery at this time.

## 2018-11-12 PROBLEM — N17.9 AKI (ACUTE KIDNEY INJURY) (HCC): Status: ACTIVE | Noted: 2018-01-01

## 2018-11-12 NOTE — ED PROVIDER NOTES
EMERGENCY DEPARTMENT HISTORY AND PHYSICAL EXAM 
 
 
Date: 11/12/2018 Patient Name: Jl Garrido History of Presenting Illness Chief Complaint Patient presents with  Vomiting  
  acute renal failure, nml e-lytes, hemodynamically stable History Provided By: Patient and EMS 
 
HPI: Jl Garrido, 58 y.o. female with PMHx significant for HTN, CKD, DM, arthritis, presents via EMS to the ED with cc of abdominal pain with associated nausea, vomiting, flank pain for the past 2 days without relief. Pt was seen at OSH and evaluated and found to be in acute renal failure. Vomiting has subsequently resolved, but given that there is no dialysis available at OSH, she was subsequently transferred here for further evaluation and treatment. She specifically denies any fevers, chills, chest pain, shortness of breath, headache, rash, diarrhea, sweating or weight loss. There are no other complaints, changes, or physical findings at this time. PCP: Alexa Narvaez NP Current Outpatient Medications Medication Sig Dispense Refill  hydrALAZINE (APRESOLINE) 50 mg tablet Take 50 mg by mouth.  bumetanide (BUMEX) 2 mg tablet Take 2 mg by mouth daily.  carvedilol (COREG) 12.5 mg tablet Take  by mouth two (2) times daily (with meals). Past History Past Medical History: 
Past Medical History:  
Diagnosis Date  Arthritis  Chronic kidney disease 2018  
  stage 5/ not dialysis patient  Chronic pain   
 from arthritis  Diabetes ([address]   
 no meds  Diabetic neuropathy ([address]  Diabetic retinopathy ([address]  HTN (hypertension)  Legally blind Past Surgical History: 
Past Surgical History:  
Procedure Laterality Date  HX GYN    
 tubal ligation  VASCULAR SURGERY PROCEDURE UNLIST  4/6/16 Creation of AV fistula right arm Family History: 
Family History Problem Relation Age of Onset  Stroke Mother  Hypertension Mother  Diabetes Father  Cancer Sister  Stroke Sister  Diabetes Brother   
     2 brothers  Diabetes Paternal Grandmother Social History: 
Social History Tobacco Use  Smoking status: Never Smoker  Smokeless tobacco: Never Used Substance Use Topics  Alcohol use: No  
 Drug use: No  
 
 
Allergies: Allergies Allergen Reactions  Demerol [Meperidine] Swelling Other reaction(s): edema Review of Systems Review of Systems Constitutional: Negative. Negative for activity change, appetite change, chills, diaphoresis, fatigue, fever and unexpected weight change. HENT: Negative. Negative for congestion, hearing loss, rhinorrhea, sneezing and voice change. Eyes: Negative. Negative for pain and visual disturbance. Respiratory: Negative. Negative for apnea, cough, choking, chest tightness and shortness of breath. Cardiovascular: Negative. Negative for chest pain and palpitations. Gastrointestinal: Positive for abdominal pain, nausea and vomiting. Negative for abdominal distention, blood in stool and diarrhea. Genitourinary: Positive for flank pain. Negative for difficulty urinating, frequency and urgency. No discharge Musculoskeletal: Negative for arthralgias, back pain, myalgias and neck stiffness. Skin: Negative. Negative for color change and rash. Neurological: Negative. Negative for dizziness, seizures, syncope, speech difficulty, weakness, numbness and headaches. Hematological: Negative for adenopathy. Psychiatric/Behavioral: Negative. Negative for agitation, behavioral problems, dysphoric mood and suicidal ideas. The patient is not nervous/anxious. Physical Exam  
Physical Exam  
Constitutional: She is oriented to person, place, and time. She appears well-developed and well-nourished. No distress. HENT:  
Head: Normocephalic and atraumatic. Mouth/Throat: Oropharynx is clear and moist. No oropharyngeal exudate. Eyes: Conjunctivae and EOM are normal. Pupils are equal, round, and reactive to light. Right eye exhibits no discharge. Left eye exhibits no discharge. Neck: Normal range of motion. Neck supple. Cardiovascular: Normal rate, regular rhythm and intact distal pulses. Exam reveals no gallop and no friction rub. No murmur heard. Pulmonary/Chest: Effort normal and breath sounds normal. No respiratory distress. She has no wheezes. She has no rales. She exhibits no tenderness. Abdominal: Soft. Bowel sounds are normal. She exhibits no distension and no mass. There is no tenderness. There is no rebound and no guarding. Musculoskeletal: Normal range of motion. She exhibits no edema. Lymphadenopathy:  
  She has no cervical adenopathy. Neurological: She is alert and oriented to person, place, and time. No cranial nerve deficit. Coordination normal.  
Skin: Skin is warm and dry. No rash noted. No erythema. Psychiatric: She has a normal mood and affect. Nursing note and vitals reviewed. Diagnostic Study Results Labs - Recent Results (from the past 12 hour(s)) METABOLIC PANEL, COMPREHENSIVE Collection Time: 11/12/18  7:21 PM  
Result Value Ref Range Sodium 147 (H) 136 - 145 mmol/L Potassium 5.1 3.5 - 5.1 mmol/L Chloride 115 (H) 97 - 108 mmol/L  
 CO2 27 21 - 32 mmol/L Anion gap 5 5 - 15 mmol/L Glucose 83 65 - 100 mg/dL BUN 53 (H) 6 - 20 MG/DL Creatinine 7.00 (H) 0.55 - 1.02 MG/DL  
 BUN/Creatinine ratio 8 (L) 12 - 20 GFR est AA 7 (L) >60 ml/min/1.73m2 GFR est non-AA 6 (L) >60 ml/min/1.73m2 Calcium 8.1 (L) 8.5 - 10.1 MG/DL Bilirubin, total 0.4 0.2 - 1.0 MG/DL  
 ALT (SGPT) 11 (L) 12 - 78 U/L  
 AST (SGOT) 11 (L) 15 - 37 U/L Alk. phosphatase 299 (H) 45 - 117 U/L Protein, total 7.2 6.4 - 8.2 g/dL Albumin 3.1 (L) 3.5 - 5.0 g/dL Globulin 4.1 (H) 2.0 - 4.0 g/dL A-G Ratio 0.8 (L) 1.1 - 2.2 MAGNESIUM  Collection Time: 11/12/18  7:21 PM  
 Result Value Ref Range Magnesium 2.3 1.6 - 2.4 mg/dL CBC WITH AUTOMATED DIFF Collection Time: 11/12/18  7:21 PM  
Result Value Ref Range WBC 7.0 3.6 - 11.0 K/uL  
 RBC 3.34 (L) 3.80 - 5.20 M/uL HGB 9.5 (L) 11.5 - 16.0 g/dL HCT 30.5 (L) 35.0 - 47.0 % MCV 91.3 80.0 - 99.0 FL  
 MCH 28.4 26.0 - 34.0 PG  
 MCHC 31.1 30.0 - 36.5 g/dL  
 RDW 13.2 11.5 - 14.5 % PLATELET 230 931 - 367 K/uL MPV 11.8 8.9 - 12.9 FL  
 NRBC 0.0 0  WBC ABSOLUTE NRBC 0.00 0.00 - 0.01 K/uL NEUTROPHILS 76 (H) 32 - 75 % LYMPHOCYTES 17 12 - 49 % MONOCYTES 6 5 - 13 % EOSINOPHILS 0 0 - 7 % BASOPHILS 0 0 - 1 % IMMATURE GRANULOCYTES 0 0.0 - 0.5 % ABS. NEUTROPHILS 5.4 1.8 - 8.0 K/UL  
 ABS. LYMPHOCYTES 1.2 0.8 - 3.5 K/UL  
 ABS. MONOCYTES 0.4 0.0 - 1.0 K/UL  
 ABS. EOSINOPHILS 0.0 0.0 - 0.4 K/UL  
 ABS. BASOPHILS 0.0 0.0 - 0.1 K/UL  
 ABS. IMM. GRANS. 0.0 0.00 - 0.04 K/UL  
 DF AUTOMATED PROTHROMBIN TIME + INR Collection Time: 11/12/18  7:21 PM  
Result Value Ref Range INR 1.0 0.9 - 1.1 Prothrombin time 10.2 9.0 - 11.1 sec LACTIC ACID Collection Time: 11/12/18  7:21 PM  
Result Value Ref Range Lactic acid 0.6 0.4 - 2.0 MMOL/L Radiologic Studies - No orders to display Medical Decision Making I am the first provider for this patient. I reviewed the vital signs, available nursing notes, past medical history, past surgical history, family history and social history. Vital Signs-Reviewed the patient's vital signs. Patient Vitals for the past 12 hrs: 
 Temp Pulse Resp BP SpO2  
11/12/18 1915    174/89 99 % 11/12/18 1900    158/85 97 % 11/12/18 1845 98.5 °F (36.9 °C) 91 12 160/83 97 % Pulse Oximetry Analysis - 97% on RA Cardiac Monitor:  
Rate: 89 bpm 
Rhythm: Normal Sinus Rhythm Records Reviewed: Nursing Notes, Old Medical Records, Previous electrocardiograms, Ambulance Run Sheet, Previous Radiology Studies and Previous Laboratory Studies Provider Notes (Medical Decision Making): DDx: acute renal failure, gastritis, gastroenteritis ED Course:  
Initial assessment performed. The patients presenting problems have been discussed, and they are in agreement with the care plan formulated and outlined with them. I have encouraged them to ask questions as they arise throughout their visit. CONSULT NOTE:  
8:21 PM 
Trevor Mccormack MD spoke with Dr. Pelon Potts, Specialty: Hospitalist 
Discussed pt's hx, disposition, and available diagnostic and imaging results. Reviewed care plans. Consultant will evaluate pt for admission. Written by Sheila Leija, ED Scribe, as dictated by Shanon Mendieta. Ken Mccormack MD. Critical Care Time:  
0 Disposition: PLAN: 
1. Admit ADMIT NOTE: 
8:21 PM 
Patient is being admitted to the hospital by Dr. Pelon Potts. The results of their tests and reasons for their admission have been discussed with them and/or available family. They convey agreement and understanding for the need to be admitted and for their admission diagnosis. Consultation has been made with the inpatient physician specialist for hospitalization. Diagnosis Clinical Impression: No diagnosis found. Attestations: This note is prepared by Sheila Leija, acting as Scribe for Shanon Mendieta. [address] Ken Mccormack MD: The scribe's documentation has been prepared under my direction and personally reviewed by me in its entirety. I confirm that the note above accurately reflects all work, treatment, procedures, and medical decision making performed by me. This note will not be viewable in [address]

## 2018-11-13 NOTE — PROGRESS NOTES
Hospitalist Progress Note NAME: Jl Garrido :  1955 MRN:  [id number] Assessment / Plan: 
DYLAN in setting of CKD5 with dehydration (nausea and L sided abdominal pain x2 weeks): has never been on HD before. Previously saw Dr. Marlene Morton and has a nonworking fisula in her RUE. - IV fluids - check lipase with next blood draw 
- consider CT A/P in AM pending clinical course 
- nephrology consulted; general sugery consulted regarding fistula : 
Cr improved marginally. Appreciate Nephrology eval. Lipase wnl. Abdo pain resolved - will not therefore pursue CT A/P. Encourage PO intake. Hypertension, benign/essential:  Stopped all her meds some time ago 
- clonidine patch ordered to try and improve compliance - prn nitrobid : Will continue Clonidine patch for now Hypoglycemia:  Likely nutritional 
- regular diet ordered 
- monitor glucose : 
AM glucose 167 - will continue monitoring. Encourage PO intake. 18.5 - 24.9 Normal weight / Body mass index is 23.46 kg/m². Code status: Full Prophylaxis: Hep SQ Recommended Disposition: TBD Subjective: Chief Complaint / Reason for Physician Visit Abdominal pain resolved. Discussed with RN events overnight. Review of Systems: 
Symptom Y/N Comments  Symptom Y/N Comments Fever/Chills n   Chest Pain n   
Poor Appetite n   Edema Cough    Abdominal Pain n   
Sputum    Joint Pain SOB/NOGUERA n   Pruritis/Rash Nausea/vomit    Tolerating PT/OT Diarrhea    Tolerating Diet y Constipation    Other Could NOT obtain due to:   
 
Objective: VITALS:  
Last 24hrs VS reviewed since prior progress note. Most recent are: 
Patient Vitals for the past 24 hrs: 
 Temp Pulse Resp BP SpO2  
18 0745 99.3 °F (37.4 °C) 81 16 162/75 97 % 18 2132 97.3 °F (36.3 °C) 91 18 167/82   
18 1915    174/89 99 % 18 1900    158/85 97 % 18 1845 98.5 °F (36.9 °C) 91 12 160/83 97 % No intake or output data in the 24 hours ending 11/13/18 1226 PHYSICAL EXAM: 
General: WD, WN. Alert, cooperative, no acute distress   
EENT:  EOMI. Anicteric sclerae. MMM Resp:  CTA bilaterally, no wheezing or rales. No accessory muscle use CV:  Regular  rhythm,  No edema GI:  Soft, Non distended, Non tender.  +Bowel sounds Neurologic:  Alert and oriented X 3, normal speech, Psych:   Good insight. Not anxious nor agitated Skin:  No rashes. No jaundice Reviewed most current lab test results and cultures  YES Reviewed most current radiology test results   YES Review and summation of old records today    NO Reviewed patient's current orders and MAR    YES 
PMH/SH reviewed - no change compared to H&P 
________________________________________________________________________ Care Plan discussed with: 
  Comments Patient x Family  x   
RN x Care Manager x Consultant Multidiciplinary team rounds were held today with , nursing, pharmacist and clinical coordinator. Patient's plan of care was discussed; medications were reviewed and discharge planning was addressed. ________________________________________________________________________ Total NON critical care TIME:  25   Minutes Total CRITICAL CARE TIME Spent:   Minutes non procedure based Comments >50% of visit spent in counseling and coordination of care    
________________________________________________________________________ Mariza Salazar MD  
 
Procedures: see electronic medical records for all procedures/Xrays and details which were not copied into this note but were reviewed prior to creation of Plan. LABS: 
I reviewed today's most current labs and imaging studies. Pertinent labs include: 
Recent Labs 11/13/18 0404 11/12/18 1921 WBC 6.8 7.0 HGB 8.3* 9.5* HCT 27.3* 30.5*  209 Recent Labs 11/13/18 0404 11/12/18 1921  147* K 4.3 5.1 * 115* CO2 24 27 * 83 BUN 53* 53* CREA 6.87* 7.00* CA 7.6* 8.1*  
MG 2.1 2.3 PHOS 4.3  --   
ALB 2.7* 3.1* TBILI 0.4 0.4 SGOT 8* 11* ALT 9* 11* INR  --  1.0 Signed: Ysabel Dumont MD

## 2018-11-13 NOTE — PROGRESS NOTES
Problem: Self Care Deficits Care Plan (Adult) Goal: *Acute Goals and Plan of Care (Insert Text) Occupational Therapy Goals Initiated 11/13/2018 1. Patient will perform grooming in standing VSS with supervision/set-up within 7 day(s). 2.  Patient will perform bathing with supervision/set-up within 7 day(s). 3.  Patient will perform upper body dressing and lower body dressing with supervision/set-up within 7 day(s). 4.  Patient will perform toilet transfers with supervision/set-up within 7 day(s). 5.  Patient will perform all aspects of toileting with supervision/set-up within 7 day(s). 6.  Patient will participate in upper extremity therapeutic exercise/activities with supervision/set-up for 5 minutes within 7 day(s). 7.  Patient will utilize energy conservation, fall prevention techniques with visual impairments during functional activities with verbal cues within 7 day(s). Occupational Therapy EVALUATION Patient: Josr Leyva (89 y.o. female) Date: 11/13/2018 Primary Diagnosis: DYLAN (acute kidney injury) ([facility] [address] Precautions:   Fall(legally blind) ASSESSMENT : 
Based on the objective data described below, the patient presents with general debility, decreased dynamic balance and strength with PMH blindness. Admitted 11-12 with DYLAN and reports feeling better once she has received fluids. Currently CGA-S self care and functional mobility tasks. Presentation was unusual: recommend modified MoCA next tx session to clarify current cognitive status with accuracy to facilitate discharge planning.  Patient inconsistent historian but appears she was mod I at w/c level at SNF for basic self care \"recently\"; has since moved to her pastors home where she needs occasional assist with mobility due to ambulatory status- no cane etc used PTA; has large healing abraded bruised area, R shin which she says is from a recent fall up brick steps, \"It healed well. \" (?) Patient reports she has been fully blind (sees light) for a few years but [facility]. She reports a less than supportive support system from daughters family. Recommend modified MoCA (for visual impairment) to clarify cognitive status as presentation is unusual. She will benefit from [facility] vs SNF depending on discharge plan. She is requesting to see a \" to help me get a w/c so I will be more safe at home. \"  
 
 
Patient will benefit from skilled intervention to address the above impairments. Patients rehabilitation potential is considered to be Good Factors which may influence rehabilitation potential include:  
[]             None noted [x]             Mental ability/status/ ?? 
[]             Medical condition [x]             Home/family situation and support systems []             Safety awareness []             Pain tolerance/management 
[]             Other: PLAN : 
Recommendations and Planned Interventions: 
[x]               Self Care Training                  [x]        Therapeutic Activities [x]               Functional Mobility Training    [x]        Cognitive Retraining 
[x]               Therapeutic Exercises           [x]        Endurance Activities [x]               Balance Training                   []        Neuromuscular Re-Education []               Visual/Perceptual Training     [x]   Home Safety Training 
[x]               Patient Education                 [x]        Family Training/Education []               Other (comment): Frequency/Duration: Patient will be followed by occupational therapy 4 times a week to address goals. Discharge Recommendations: Home Health vs SNF (Not [facility]) Further Equipment Recommendations for Discharge: TBA SUBJECTIVE:  
Patient stated it was much safer when I had a w/c- I could do it all for myself but without the w/c I need help because I cant see so its harder to get around.  OBJECTIVE DATA SUMMARY:  
HISTORY:  
Past Medical History:  
Diagnosis Date  Arthritis  Chronic kidney disease 2018  
  stage 5/ not dialysis patient  Chronic pain   
 from arthritis  Diabetes ([facility] [address]   
 no meds  Diabetic neuropathy ([facility] [address]  Diabetic retinopathy ([facility] [address]  HTN (hypertension)  Legally blind Past Surgical History:  
Procedure Laterality Date  HX GYN    
 tubal ligation  VASCULAR SURGERY PROCEDURE UNLIST  4/6/16 Creation of AV fistula right arm Prior Level of Function/Environment/Context: patient inconsistent historian but appears she was mod I at w/c level at SNF for basic self care; has since moved to her pastors home where she needs occasional assist with mobility due to ambulatory status- no cane etc used PTA; has large healing abraded bruised area, R shin which she says is from a recent fall up brick steps, \"It healed well. \" (?) Patient reports she has been fully blind (sees light) for a few years but not followed by [place] [facility] visually impaired. She reports a less than supportive support system from daughters family. Recommend modified MoCA (for visual impairment) to clarify cognitive status as presentation is unusual. She will benefit from [place] vs SNF depending on discharge plan. She is requesting to see a \" to help me get a w/c so I will be more safe at home. \" Home Situation Home Environment: Private residence # Steps to Enter: 3 Rails to Enter: No 
One/Two Story Residence: One story Living Alone: No 
Support Systems: [employer] / raul community, Child(russell) Patient Expects to be Discharged to[de-identified] Private residence Current DME Used/Available at Home: None Tub or Shower Type: Shower Hand dominance: RightEXAMINATION OF PERFORMANCE DEFICITS: 
Cognitive/Behavioral Status: 
Neurologic State: Alert Orientation Level: Oriented to place;Oriented to situation;Oriented to person Cognition: Follows commands Perception: Appears intact Perseveration: Perseverates during conversation(worried about residue from leads on chest) Safety/Judgement: Awareness of environment;Decreased insight into deficitsSkin: see above re R shin; would benefit from podiatry consult delroy regarding her level of DM impairment Edema: + B feet, R bigger than L Hearing: Auditory Auditory Impairment: NoneVision/Perceptual:   
    
    
    
  
    
Acuity: Impaired near vision; Impaired far vision(legally blind; sees light; has not had VDVI assessment) Corrective Lenses: (NA) Range of Motion: 
B UE 
AROM: Generally decreased, functional 
PROM: Within functional limits Strength: 
B UE 
Strength: Generally decreased, functional 
  
  
  
 Coordination: 
Coordination: Within functional limits Fine Motor Skills-Upper: Left Intact; Right Intact(limited by visual impairment) Gross Motor Skills-Upper: Left Intact; Right Intact Tone & Sensation: 
B UE Tone: Normal 
Sensation: Intact Balance: 
Sitting: Intact Standing: Impaired Standing - Static: Fair Standing - Dynamic : FairFunctional Mobility and Transfers for ADLs:Bed Mobility: 
Rolling: Modified independent Supine to Sit: Supervision Sit to Supine: Contact guard assistance Scooting: Supervision Transfers: 
Sit to Stand: Minimum assistance Stand to Sit: Contact guard assistance Bed to Chair: Minimum assistance Bathroom Mobility: Minimum assistance Toilet Transfer : Minimum assistance ADL Assessment: 
Feeding: Setup(uses clock method) Oral Facial Hygiene/Grooming: Setup Bathing: Contact guard assistance Upper Body Dressing: Setup;Supervision Lower Body Dressing: Setup;Contact guard assistance; Additional time Toileting: Contact guard assistance ADL Intervention and task modifications: 
  
Initiated training of fall prevention in the hospital delroy with visual deficits, advised to not try to stand up alone and to use call bell which has been adapted with cotton ball for touch assist; initiated training of energy conservation and benefit of participating with [place] [facility] Cognitive Retraining Safety/Judgement: Awareness of environment;Decreased insight into deficits Functional Measure: 
Barthel Index: 
 
Bathin Bladder: 10 Bowels: 10 
Groomin Dressin Feeding: 10 Mobility: 0 Stairs: 0 Toilet Use: 5 Transfer (Bed to Chair and Back): 10 Total: 55 Barthel and G-code impairment scale: 
Percentage of impairment CH 
0% CI 
1-19% CJ 
20-39% CK 
40-59% CL 
60-79% CM 
80-99% CN 
100% Barthel Score 0-100 100 99-80 79-60 59-40 20-39 1-19 
 0 Barthel Score 0-20 20 17-19 13-16 9-12 5-8 1-4 0 The Barthel ADL Index: Guidelines 1. The index should be used as a record of what a patient does, not as a record of what a patient could do. 2. The main aim is to establish degree of independence from any help, physical or verbal, however minor and for whatever reason. 3. The need for supervision renders the patient not independent. 4. A patient's performance should be established using the best available evidence. Asking the patient, friends/relatives and nurses are the usual sources, but direct observation and common sense are also important. However direct testing is not needed. 5. Usually the patient's performance over the preceding 24-48 hours is important, but occasionally longer periods will be relevant. 6. Middle categories imply that the patient supplies over 50 per cent of the effort. 7. Use of aids to be independent is allowed. Jada Vila., Barthel, D.W. ([id number]). Functional evaluation: the Barthel Index. [address] [facility] (14)2. Shelia Crockett kamran HEIDY Turpin, Ana Luisa Werner., Justyna Tiwari., [facility] [place], [address] [place] [address] ().  Measuring the change indisability after inpatient rehabilitation; comparison of the responsiveness of the Barthel Index and Functional [place] Measure. Journal of Neurology, Neurosurgery, and Psychiatry, 66(4), 043-090. ANNA Greenfield.ROBERT, VIVIANA Sheppard, & Amanda Melendrez M.A. (2004.) Assessment of post-stroke quality of life in cost-effectiveness studies: The usefulness of the Barthel Index and the EuroQoL-5D. [facility], 13, 297-96 G codes: In compliance with CMSs Claims Based Outcome Reporting, the following G-code set was chosen for this patient based on their primary functional limitation being treated: The outcome measure chosen to determine the severity of the functional limitation was the Barthel Index  with a score of 55/100 which was correlated with the impairment scale. ? Self Care:  
  - CURRENT STATUS: CK - 40%-59% impaired, limited or restricted  - GOAL STATUS: CI - 1%-19% impaired, limited or restricted  - D/C STATUS:  ---------------To be determined--------------- Occupational Therapy Evaluation Charge Determination History Examination Decision-Making LOW Complexity : Brief history review  MEDIUM Complexity : 3-5 performance deficits relating to physical, cognitive , or psychosocial skils that result in activity limitations and / or participation restrictions MEDIUM Complexity : Patient may present with comorbidities that affect occupational performnce. Miniml to moderate modification of tasks or assistance (eg, physical or verbal ) with assesment(s) is necessary to enable patient to complete evaluation Based on the above components, the patient evaluation is determined to be of the following complexity level: LOW Pain: 
Pain Scale 1: Numeric (0 - 10) Pain Intensity 1: 0 Activity Tolerance:  
good Please refer to the flowsheet for vital signs taken during this treatment. After treatment:  
[] Patient left in no apparent distress sitting up in chair [x] Patient left in no apparent distress in bed 
[x] Call bell left within reach [x] Nursing notified 
[] Caregiver present 
[] Bed alarm activated COMMUNICATION/EDUCATION:  
The patients plan of care was discussed with: Physical Therapist and Registered Nurse. [x] Home safety education was provided and the patient/caregiver indicated understanding. [x] Patient/family have participated as able in goal setting and plan of care. [x] Patient/family agree to work toward stated goals and plan of care. [] Patient understands intent and goals of therapy, but is neutral about his/her participation. [] Patient is unable to participate in goal setting and plan of care. This patients plan of care is appropriate for delegation to [facility]. Thank you for this referral. 
Maximo Byrd OTR/L Time Calculation: 29 mins
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
## 2018-11-13 NOTE — CONSULTS
[address] [place] [address] Mariana Bass MR#: [id number] : 1955 ACCOUNT #: [de-identified] DATE OF SERVICE: 2018 REFERRING PHYSICIAN:  Dr. Loida Ontiveros. REASON FOR CONSULTATION:  ARF on CKD. The patient is a 70-year-old -American female with history of CKD5, hypertension, diabetes, noncompliance, who is admitted with complaint of abdominal pain and nausea. She is found to have renal failure with a creatinine of 7.0, BUN of 53, and sodium of 147. Patient is a poor historian. History from her daughter-in-law and also from her family friend. Patient lives with her , her 's wife is her friend. The patient has been noncompliant. She has been seen by Dr. Jasmyne Francois and Dr. Oxana Huang in the past.  The patient had AV fistula placed in the past by Dr. Tiffanie Jeronimo. She has refused dialysis. She has not followed up with Dr. Oxana Huang for more than a year now. Patient reports nausea, abdominal pain. No vomiting. She denies taking any NSAIDs. She had an AV fistula placed in 2016. She has anemia. Her hemoglobin level is 8.3. She is not getting any Epogen. As per daughter-in-law, she has refused to take metformin in the past.  Patient is legally blind. She has diabetic retinopathy. She denies any chest pain, shortness of breath. No dysuria or hematuria. PAST MEDICAL HISTORY: 1.  CKD stage 5. 
2.  Arthritis. 3.  Diabetes. 4.  Diabetic neuropathy. 5.  Diabetic retinopathy. 6.  Hypertension. 7.  Legally blind. 8.  Tubal ligation. 9.  Upper arm AV fistula placement in 2016 by Dr. Tiffanie Jeronimo. SOCIAL HISTORY:  Lives with friend, 's wife. Nonsmoker, no alcohol abuse. FAMILY HISTORY:  Positive for stroke and hypertension to mother, brother has diabetes. ALLERGIES:  ALLERGIC TO DEMEROL. HOME MEDICATIONS:  Bumex, Coreg, hydralazine.  
 
REVIEW OF SYSTEMS:  Limited, complaining of generalized weakness, abdominal pain and nausea. No vomiting. No dysuria or hematuria. Total 11 systems reviewed, they are essentially negative. PHYSICAL EXAMINATION: 
VITAL SIGNS:  Temperature 99.3, pulse 81, blood pressure 162/75, respiration rate 16. GENERAL:  Patient is lying in bed, comfortable, not in apparent distress, alert, awake, oriented x3. NECK:  Supple. No palpable neck mass. HEENT:  Oral mucosa is dry. Conjunctival pallor present. LUNGS:  Clear to auscultation bilaterally. No wheezing or crackles. CARDIOVASCULAR:  Normal S1, S2, regular rate and rhythm. ABDOMEN:  Soft, nontender, bowel sounds present. EXTREMITIES:  No edema. Upper extremity, right arm scar. No bruit or thrill present on the fistula. NEUROLOGIC:  Nonfocal. 
EYES:  Legally blind. SKIN:  No rash. GENITOURINARY:  No Del Castillo catheter. JOINTS:  No joint effusion or tenderness. LABORATORY AND DIAGNOSTIC DATA:  Labs from this morning, hemoglobin 8.3, platelet 414. UA positive for protein, small amount of leukocyte esterase, 10-20 WBC. Sodium 145, BUN 53, creatinine 6.87. Labs from 03/2016, creatinine 4.97 with BUN of 56. ASSESSMENT: 
1. Acute renal failure on chronic kidney disease, most likely due to progression of chronic kidney disease. 2.  Chronic kidney disease due to hypertension and diabetes. 3.  Type 2 diabetes. 4.  Hypertension. 5.  Anemia. 6.  Hypernatremia, likely due to dehydration. 7.  Noncompliance. PLAN:   
1. Change IV fluids to hypotonic IV fluids 0.45 normal saline at 75 mL per hour. 2.  Check iron panel. 3.  Give Epogen. 4.  She is not a dialysis candidate. I have discussed with her son and daughter-in-law. The patient's refusal of dialysis for last 1-1/2 years suggest that she would not be a good dialysis candidate. We will get palliative care team involved to help us out with decision making. She will need a SNIF placement. Thanks for consultation. We will follow the patient with you. MD CRISTOBAL Gomez / MAHENDRA 
D: 11/13/2018 11:37    
T: 11/13/2018 11:58 JOB #: [id number] CC: Tanner Anderson NP

## 2018-11-13 NOTE — PROGRESS NOTES
Assumed care of this 59 yo AA female who arrived on unit via stretcher from ED accompanied by her son. Pt off of stretcher and ambulated to BR and then scale and bed. Denies pain/discomfort at this time. VS obtained and assessment completed. Continue to monitor.

## 2018-11-13 NOTE — PROGRESS NOTES
Report received from [facility] in the ED. Clarification on several orders to be received prior to patient coming to unit. Also Stat labs to be drawn/obtained. Awaiting arrival of patient.

## 2018-11-13 NOTE — PROGRESS NOTES
Oncology Interdisciplinary rounds were held today to discuss patient plan of care and outcomes. The following members were present: Nursing, Case Management, Pharmacy and Dietary. Actual Length of Stay: 1 Expected Length of Stay: - - - Plan               Mobility         Discharge Plan Nephrology consult I/O IVF 
 PT/OT Home with family 11/16

## 2018-11-13 NOTE — H&P
Hospitalist Admission NoteNAME: Transmit :  1955 MRN:  [id number] Date/Time:  2018 8:25 PM 
 
Patient PCP: Amy Rg NP 
______________________________________________________________________ Given the patient's current clinical presentation, I have a high level of concern for decompensation if discharged from the emergency department. Complex decision making was performed, which includes reviewing the patient's available past medical records, laboratory results, and x-ray films. My assessment of this patient's clinical condition and my plan of care is as follows. Assessment / Plan: 
DYLAN in setting of CKD5 with dehydration (nausea and L sided abdominal pain x2 weeks): has never been on HD before. Previously saw Dr. Felipe eBnnett and has a nonworking fisula in her RUE. - IV fluids - check lipase with next blood draw 
- consider CT A/P in AM pending clinical course 
- nephrology consulted; general sugery consulted regarding fistula Hypertension, benign/essential:  Stopped all her meds some time ago 
- clonidine patch ordered to try and improve compliance - prn nitrobid Hypoglycemia:  Likely nutritional 
- regular diet ordered 
- monitor glucose Code Status: Full Surrogate Decision Maker: son DVT Prophylaxis: heparin Baseline: rents a room from her  Subjective: CHIEF COMPLAINT:  Nausea and abdominal pain HISTORY OF PRESENT ILLNESS:    
Jerel Paul is a 58 y.o.  female who presents with above. Pt in her usual state of health until about 2 weeks ago. At that time, she developed nausea with vomiting. She complains of \"stomach pain\" which wraps around to the L side. She denies diarrhea at this time with last BM yesterday. She denies fevers at home but does not check her temperature. She denies SOB or CP. No recent URI sx.   She denies new weakness or edema. She says that she still urinates well. She has a RUE fistula which she says she was told was not working about 2 years ago. She has previously seen Dr. Brittny Arora for her renal failure, but not recently. We were asked to admit for work up and evaluation of the above problems. Past Medical History:  
Diagnosis Date  Arthritis  Chronic kidney disease 2018  
  stage 5/ not dialysis patient  Chronic pain   
 from arthritis  Diabetes ([facility] [facility]   
 no meds  Diabetic neuropathy ([facility]  Diabetic retinopathy ([facility] [facility]  HTN (hypertension)  Legally blind Past Surgical History:  
Procedure Laterality Date  HX GYN    
 tubal ligation  VASCULAR SURGERY PROCEDURE UNLIST  4/6/16 Creation of AV fistula right arm Social History Tobacco Use  Smoking status: Never Smoker  Smokeless tobacco: Never Used Substance Use Topics  Alcohol use: No  
  
 
Family History Problem Relation Age of Onset  Stroke Mother  Hypertension Mother  Diabetes Father  Cancer Sister  Stroke Sister  Diabetes Brother   
     2 brothers  Diabetes Paternal Grandmother Allergies Allergen Reactions  Demerol [Meperidine] Swelling Other reaction(s): edema Prior to Admission medications Medication Sig Start Date End Date Taking? Authorizing Provider  
hydrALAZINE (APRESOLINE) 50 mg tablet Take 50 mg by mouth. Provider, Historical  
bumetanide (BUMEX) 2 mg tablet Take 2 mg by mouth daily. Other, MD Lucita  
carvedilol (COREG) 12.5 mg tablet Take  by mouth two (2) times daily (with meals). Lucita Mauricio MD  
 
 
REVIEW OF SYSTEMS:    
I am not able to complete the review of systems because: The patient is intubated and sedated The patient has altered mental status due to his acute medical problems The patient has baseline aphasia from prior stroke(s) The patient has baseline dementia and is not reliable historian The patient is in acute medical distress and unable to provide information Total of 12 systems reviewed as follows:   
   POSITIVE= underlined text  Negative = text not underlined General:  fever, chills, sweats, generalized weakness, weight loss/gain,  
   loss of appetite Eyes:    blurred vision, eye pain, loss of vision, double vision ENT:    rhinorrhea, pharyngitis Respiratory:   cough, sputum production, SOB, NOGUERA, wheezing, pleuritic pain  
Cardiology:   chest pain, palpitations, orthopnea, PND, edema, syncope Gastrointestinal:  abdominal pain , N/V, diarrhea, dysphagia, constipation, bleeding Genitourinary:  frequency, urgency, dysuria, hematuria, incontinence Muskuloskeletal :  arthralgia, myalgia, back pain Hematology:  easy bruising, nose or gum bleeding, lymphadenopathy Dermatological: rash, ulceration, pruritis, color change / jaundice Endocrine:   hot flashes or polydipsia Neurological:  headache, dizziness, confusion, focal weakness, paresthesia, Speech difficulties, memory loss, gait difficulty Psychological: Feelings of anxiety, depression, agitation Objective: VITALS:   
Visit Vitals /89 Pulse 91 Temp 98.5 °F (36.9 °C) Resp 12 Ht 5' 4\" (1.626 m) SpO2 99% BMI 26.43 kg/m² PHYSICAL EXAM: 
 
General:    Alert, cooperative, no distress, appears stated age. HEENT: Atraumatic, anicteric sclerae, pink conjunctivae No oral ulcers, mucosa moist, throat clear, dentition fair Neck:  Supple, symmetrical,  thyroid: non tender Lungs:   Clear to auscultation bilaterally. No Wheezing or Rhonchi. No rales. Chest wall:  No tenderness  No Accessory muscle use. Heart:   Regular  rhythm,  No  murmur   No edema Abdomen:   Soft, non-tender. Not distended. Bowel sounds normal 
Extremities: No cyanosis. No clubbing,   
  Skin turgor normal, Capillary refill normal, Radial dial pulse 2+ Skin:     Not pale. Not Jaundiced  No rashes Psych:  Some insight. Not depressed. Not anxious or agitated. Neurologic: EOMs intact. No facial asymmetry. No aphasia or slurred speech. Symmetrical strength, Sensation grossly intact. Alert and oriented X 4.  
 
_______________________________________________________________________ Care Plan discussed with: 
  Comments Patient x Family  x Son at bedside RN x Care Manager Consultant:     
_______________________________________________________________________ Expected  Disposition:  
Home with Family x HH/PT/OT/RN x  
SNF/LTC   
VAL   
________________________________________________________________________ TOTAL TIME:  50 Minutes Critical Care Provided     Minutes non procedure based Comments  
 x Reviewed previous records  
>50% of visit spent in counseling and coordination of care x Discussion with patient and/or family and questions answered 
  
 
________________________________________________________________________ Signed: Kasie Marquez MD 
 
Procedures: see electronic medical records for all procedures/Xrays and details which were not copied into this note but were reviewed prior to creation of Plan. LAB DATA REVIEWED:   
Recent Results (from the past 24 hour(s)) METABOLIC PANEL, COMPREHENSIVE Collection Time: 11/12/18  7:21 PM  
Result Value Ref Range Sodium 147 (H) 136 - 145 mmol/L Potassium 5.1 3.5 - 5.1 mmol/L Chloride 115 (H) 97 - 108 mmol/L  
 CO2 27 21 - 32 mmol/L Anion gap 5 5 - 15 mmol/L Glucose 83 65 - 100 mg/dL BUN 53 (H) 6 - 20 MG/DL Creatinine 7.00 (H) 0.55 - 1.02 MG/DL  
 BUN/Creatinine ratio 8 (L) 12 - 20 GFR est AA 7 (L) >60 ml/min/1.73m2 GFR est non-AA 6 (L) >60 ml/min/1.73m2 Calcium 8.1 (L) 8.5 - 10.1 MG/DL Bilirubin, total 0.4 0.2 - 1.0 MG/DL  
 ALT (SGPT) 11 (L) 12 - 78 U/L  
 AST (SGOT) 11 (L) 15 - 37 U/L Alk. phosphatase 299 (H) 45 - 117 U/L Protein, total 7.2 6.4 - 8.2 g/dL Albumin 3.1 (L) 3.5 - 5.0 g/dL Globulin 4.1 (H) 2.0 - 4.0 g/dL A-G Ratio 0.8 (L) 1.1 - 2.2 MAGNESIUM Collection Time: 11/12/18  7:21 PM  
Result Value Ref Range Magnesium 2.3 1.6 - 2.4 mg/dL CBC WITH AUTOMATED DIFF Collection Time: 11/12/18  7:21 PM  
Result Value Ref Range WBC 7.0 3.6 - 11.0 K/uL  
 RBC 3.34 (L) 3.80 - 5.20 M/uL HGB 9.5 (L) 11.5 - 16.0 g/dL HCT 30.5 (L) 35.0 - 47.0 % MCV 91.3 80.0 - 99.0 FL  
 MCH 28.4 26.0 - 34.0 PG  
 MCHC 31.1 30.0 - 36.5 g/dL  
 RDW 13.2 11.5 - 14.5 % PLATELET 704 255 - 733 K/uL MPV 11.8 8.9 - 12.9 FL  
 NRBC 0.0 0  WBC ABSOLUTE NRBC 0.00 0.00 - 0.01 K/uL NEUTROPHILS 76 (H) 32 - 75 % LYMPHOCYTES 17 12 - 49 % MONOCYTES 6 5 - 13 % EOSINOPHILS 0 0 - 7 % BASOPHILS 0 0 - 1 % IMMATURE GRANULOCYTES 0 0.0 - 0.5 % ABS. NEUTROPHILS 5.4 1.8 - 8.0 K/UL  
 ABS. LYMPHOCYTES 1.2 0.8 - 3.5 K/UL  
 ABS. MONOCYTES 0.4 0.0 - 1.0 K/UL  
 ABS. EOSINOPHILS 0.0 0.0 - 0.4 K/UL  
 ABS. BASOPHILS 0.0 0.0 - 0.1 K/UL  
 ABS. IMM. GRANS. 0.0 0.00 - 0.04 K/UL  
 DF AUTOMATED PROTHROMBIN TIME + INR Collection Time: 11/12/18  7:21 PM  
Result Value Ref Range INR 1.0 0.9 - 1.1 Prothrombin time 10.2 9.0 - 11.1 sec LACTIC ACID Collection Time: 11/12/18  7:21 PM  
Result Value Ref Range  Lactic acid 0.6 0.4 - 2.0 MMOL/L

## 2018-11-13 NOTE — ED NOTES
TRANSFER - OUT REPORT: 
 
Verbal report given to Radha Salazar (name) on [employer]  being transferred to Oncology(unit) for routine progression of care Report consisted of patients Situation, Background, Assessment and  
Recommendations(SBAR). Information from the following report(s) SBAR, Kardex, ED Summary, Intake/Output, MAR, Recent Results and Med Rec Status was reviewed with the receiving nurse. Lines:    
 
Opportunity for questions and clarification was provided. Patient transported with: 
 [employer]

## 2018-11-13 NOTE — CONSULTS
Palliative Medicine Consult [place]on: [phone] ([phone]) Patient Name: Snehal Trent YOB: 1955 Date of Initial Consult: 18 Reason for Consult: end stage disease Requesting Provider: Dr. María Julian Primary Care Physician: Katherine Martinez NP 
 
 SUMMARY:  
Snehal Trent is a 58 y.o. with a past history of DM for >25years, with retinopathy (legally blind) neuropathy and nephropathy, now with ESRD has refused HD for past 2 years,HTN,  who was admitted on 2018 from home with a diagnosis of abdominal pain and nausea . Current medical issues leading to Palliative Medicine involvement include: debility from long term DM complicated by blindness, peripheral neuropathy and nephropathy/ ESRD, nausea due to uremia resolved with hydration, anemia of chronic renal disease. Renal consult notes that patient is not a candidate for HD, she has long hx of refusing HD and not following up with MD appointments. Patient is , she has 5 adult children (Marzella Rank, [address], Hakan Lion and [address]). She has been living with a friend Chandu Fink (and her ), caregiver (? Medicaid) during day is Lisa's daughter, Pedro Ruby. In the past patient has been at the [facility] in [place]. PALLIATIVE DIAGNOSES:  
1. ESRD, still makes some urine, HD recommended 2 years ago, patient fairly stable since then 2. DM, complicated by retinopathy, nephropathy, neuropathy 3. Chronic bilateral hip pain 4. Hypoalbuminemia PLAN:  
1. Palliative Medicine services introduced to patient, see also Aimee Ames note, LCSW. 
1. [place] of past medical events 2. Engaged in discussion about ESRD, HD. Patient has past experience with friends/family who  shortly after starting HD.   She understands the risk of infection, etc and also understands that each person is different and the more other medical problems you have, the less likely dialysis will be well tolerated. .We discussed some of the reasons people choose to do or not to do dialysis. She notes she was told in 2016 that she had 6-12months to live, and here she is. Discussed uncertainty of prognosis with or without dialysis. Patient says she knows that time is limited when your kidneys are not working well, but she's going to just take it \"a day at time\". She worries about being a burden to friends/family, doesn't want them to worry about her. 3. Provided education about mPOA/ AMD- importance of completing one. (NOK 5 adult children) Patient has never completed in the past.  She needs more time to think about it, but she thinks maybe Marti Cortez would be the one who could take on this roll if she became unable to make her own decisions. .Encouraged her to think some more about mPOA and will revisit tomorrow and see if she has made decision about completing that paperwork. 4. Patient tells us she is returning to [facility] at time of discharge. We then learned from Case management that Ileana Bermeo is not longer willing to have patient live there. We explained that to patient, and she was not surprised, says that she could tell by her attitude lately that might happen soon. Explained that case management will look for LTC medicaid bed, patient hopeful for it to be in [facility] near her family. 5. Patient gave permission for our team to contact her son Marti Cortez to set meeting for tomorrow to talk about long term care goals. She says he was rather upset yesterday, she thinks because of information provided about how sick she is, that she has limited life expectancy 2. Initial consult note routed to primary continuity provider 3. Communicated plan of care with: Palliative IDT, case management GOALS OF CARE / TREATMENT PREFERENCES:  
 
GOALS OF CARE: 
Patient/Health Care Proxy Stated Goals: Prolong life TREATMENT PREFERENCES:  
Code Status: Full Code Code status not yet discussed by our team  
Advance Care Planning: 
[] The [place] [id number] Team has updated the ACP Navigator with [address] and Patient Capacity Primary Decision Maker ([address]): 5 adult children are [facility] Relationship to patient: 
Phone number: 
[] Named in a scanned document  
[x] Legal Next of Kin 
[] Guardian Secondary Decision Maker (First Alternate Health Care Agent):  
Relationship to patient: 
Phone number: 
[] Named in a scanned document  
[] Legal Next of Kin 
[] Guardian Medical Interventions: Limited additional interventions Other Instructions: Other: As far as possible, the palliative care team has discussed with patient / health care proxy about goals of care / treatment preferences for patient. HISTORY:  
 
History obtained from: chart, patient CHIEF COMPLAINT: admitted with nausea HPI/SUBJECTIVE: The patient is:  
[x] Verbal and participatory [] Non-participatory due to:  
 
58year old female with known ESRD (has refused HD always in past), who was admitted with nausea, abdominal pain Clinical Pain Assessment (nonverbal scale for severity on nonverbal patients):  
Clinical Pain Assessment Severity: 0 Duration: for how long has pt been experiencing pain (e.g., 2 days, 1 month, years) Frequency: how often pain is an issue (e.g., several times per day, once every few days, constant) FUNCTIONAL ASSESSMENT:  
 
Palliative Performance Scale (PPS): PPS: 40 PSYCHOSOCIAL/SPIRITUAL SCREENING:  
 
Palliative IDT has assessed this patient for cultural preferences / practices and a referral made as appropriate to needs (Cultural Services, Patient Advocacy, Ethics, etc.) Any spiritual / Hinduism concerns: 
[] Yes /  [x] No 
 
Caregiver Burnout: 
[] Yes /  [] No /  [x] No Caregiver Present Anticipatory grief assessment:  
[x] Normal  / [] Maladaptive ESAS Anxiety: Anxiety: 0 ESAS Depression: Depression: 0 REVIEW OF SYSTEMS:  
 
Positive and pertinent negative findings in ROS are noted above in HPI. The following systems were [x] reviewed / [] unable to be reviewed as noted in HPI Other findings are noted below. Systems: constitutional, ears/nose/mouth/throat, respiratory, gastrointestinal, genitourinary, musculoskeletal, integumentary, neurologic, psychiatric, endocrine. Positive findings noted below. Modified ESAS Completed by: provider Fatigue: 0 Drowsiness: 0 Depression: 0 Pain: 0 Anxiety: 0 Nausea: 0 Anorexia: 3 Dyspnea: 0 Constipation: No  
     
 
 
 PHYSICAL EXAM:  
 
From RN flowsheet: 
Wt Readings from Last 3 Encounters:  
11/12/18 62 kg (136 lb 11 oz) 04/16/18 63.8 kg (140 lb 9.6 oz)  
03/27/18 68.5 kg (151 lb 0.2 oz) Blood pressure 162/75, pulse 81, temperature 99.3 °F (37.4 °C), resp. rate 16, height 5' 4\" (1.626 m), weight 62 kg (136 lb 11 oz), SpO2 97 %, not currently breastfeeding. Pain Scale 1: Numeric (0 - 10) Pain Intensity 1: 0 Last bowel movement, if known:  
 
Constitutional: patient is awake, alert NAD Blind Engaging in conversation No respiratory distress Sitting on side of bed combing her hair Affect normal, cooperative Insight is good mostly, although some misperceptions about causality of coincidental past medical events. HISTORY:  
 
Active Problems: 
  DYLAN (acute kidney injury) ([address] (11/12/2018) Past Medical History:  
Diagnosis Date  Arthritis  Chronic kidney disease 2018  
  stage 5/ not dialysis patient  Chronic pain   
 from arthritis  Diabetes ([address]   
 no meds  Diabetic neuropathy ([address]  Diabetic retinopathy ([address]  HTN (hypertension)  Legally blind Past Surgical History:  
Procedure Laterality Date  HX GYN    
 tubal ligation  VASCULAR SURGERY PROCEDURE UNLIST  4/6/16 Creation of AV fistula right arm Family History Problem Relation Age of Onset  Stroke Mother  Hypertension Mother  Diabetes Father  Cancer Sister  Stroke Sister  Diabetes Brother   
     2 brothers  Diabetes Paternal Grandmother History reviewed, no pertinent family history. Social History Tobacco Use  Smoking status: Never Smoker  Smokeless tobacco: Never Used Substance Use Topics  Alcohol use: No  
 
Allergies Allergen Reactions  Demerol [Meperidine] Swelling Other reaction(s): edema Current Facility-Administered Medications Medication Dose Route Frequency  0.45% sodium chloride infusion  75 mL/hr IntraVENous CONTINUOUS  
 amLODIPine (NORVASC) tablet 5 mg  5 mg Oral DAILY  epoetin jackelyn (EPOGEN;PROCRIT) injection 10,000 Units  10,000 Units SubCUTAneous Q TUE, THU & SAT  hydrALAZINE (APRESOLINE) 20 mg/mL injection 20 mg  20 mg IntraVENous Q6H PRN  
 traMADol (ULTRAM) tablet 50 mg  50 mg Oral Q12H PRN  
 sodium chloride (NS) flush 5-10 mL  5-10 mL IntraVENous Q8H  
 sodium chloride (NS) flush 5-10 mL  5-10 mL IntraVENous PRN  
 acetaminophen (TYLENOL) tablet 650 mg  650 mg Oral Q4H PRN  
 ondansetron (ZOFRAN) injection 4 mg  4 mg IntraVENous Q4H PRN  
 bisacodyl (DULCOLAX) tablet 5 mg  5 mg Oral DAILY PRN  
 heparin (porcine) injection 5,000 Units  5,000 Units SubCUTAneous Q12H  
 nitroglycerin (NITROBID) 2 % ointment 1 Inch  1 Inch Topical Q6H PRN  
 cloNIDine (CATAPRES) 0.2 mg/24 hr patch 1 Patch  1 Patch TransDERmal Q7D  
 
 
 
 LAB AND IMAGING FINDINGS:  
 
Lab Results Component Value Date/Time WBC 6.8 11/13/2018 04:04 AM  
 HGB 8.3 (L) 11/13/2018 04:04 AM  
 PLATELET 653 38/13/4965 04:04 AM  
 
Lab Results Component Value Date/Time  Sodium 145 11/13/2018 04:04 AM  
 Potassium 4.3 11/13/2018 04:04 AM  
 Chloride 112 (H) 11/13/2018 04:04 AM  
 CO2 24 11/13/2018 04:04 AM  
 BUN 53 (H) 11/13/2018 04:04 AM  
 Creatinine 6.87 (H) 11/13/2018 04:04 AM  
 Calcium 7.6 (L) 11/13/2018 04:04 AM  
 Magnesium 2.1 11/13/2018 04:04 AM  
 Phosphorus 4.3 11/13/2018 04:04 AM  
  
Lab Results Component Value Date/Time AST (SGOT) 8 (L) 11/13/2018 04:04 AM  
 Alk. phosphatase 256 (H) 11/13/2018 04:04 AM  
 Protein, total 6.1 (L) 11/13/2018 04:04 AM  
 Albumin 2.7 (L) 11/13/2018 04:04 AM  
 Globulin 3.4 11/13/2018 04:04 AM  
 
Lab Results Component Value Date/Time INR 1.0 11/12/2018 07:21 PM  
 Prothrombin time 10.2 11/12/2018 07:21 PM  
  
No results found for: IRON, FE, TIBC, IBCT, PSAT, FERR No results found for: PH, PCO2, PO2 No components found for: [place] No results found for: CPK, CKMB Total time: 70min Counseling / coordination time, spent as noted above: 45 
> 50% counseling / coordination?: yes Prolonged service was provided for  []30 min   []75 min in face to face time in the presence of the patient, spent as noted above. Time Start:  
Time End:  
Note: this can only be billed with 26602 (initial) or 86064 (follow up). If multiple start / stop times, list each separately.

## 2018-11-13 NOTE — CONSULTS
[address] [facility]  KDM:[id number]   TERA:1/[birth year]  
 
           [id number] 
arf 
ckd Anemia She is not a dialysis candiate Give epogen check iron panel Labs in am 
 
D/w son. He understand and agrees. Yari Hale MD 
[facility] [address] [place], [address] [facility] Phone - [phone] Fax - [phone] . [address] [facility] Phone - [phone] Fax - [phone]    
[email or web address]. [email or web address]

## 2018-11-13 NOTE — PROGRESS NOTES
Reason for Admission:   \"side pain and vomiting\" - Transferred from [address] Score:     21 Resources/supports as identified by patient/family:  None identified other than [employer]/ [employer] - family and current caregiver unable to provide 24 hour care for patient - she will need LTC at discharge Top Challenges facing patient (as identified by patient/family and CM): Finances/Medication cost?      [employer]/ [place] [employer] -  
           
Transportation? [employer] transport Support system or lack thereof? Son - Tawanna Campuzano - phone number unavailable; DIL (Waldemar's wife - but they are ) Carmen Kruger - [phone]; Daughter - Barbara Marion - [phone] Living arrangements? Patient was at [facility] prior to moving to her friend's home in [facility] - she is currently living with Farrah Yates - [phone] - who states patient would \"not be able to return to my home due to the amount of care she will need\". Self-care/ADLs/Cognition? Patient is BLIND; requires hand to hand assistance with ADL's; patient seems alert and oriented x 3 but exhibits symptoms of paranoia (people stealing her clothes and money; Nirmala Shultz thinks I want her \"; everyone is choosing sex partner and selling drugs all day) which cannot be proven. She refers to these actions occurring in multiple living situations. Current Advanced Directive/Advance Care Plan:  No Advanced Directive on Chart - patient currently Full Code - Palliative Care involved Plan for utilizing home health:    None at present time - looking for LTC Likelihood of readmission: High - patient not a hemodialysis candidate with ESRD Transition of Care Plan:        CM in to see patient and complete initial CM Assessment - patient lying in bed with eyes open and confirms that she is unable to see anything in the room - she is able to tell that the light is turned on, but \"everything else is black\". Patient is able to report details of previous stay with her daughter and admission to [facility], but unable to confirm dates. She states she was told not to return to her daughter's home due to poor health conditions and lack of care. Patient was admitted to [facility] and stayed there until her friend, Eve Martinez took her home to \"rent a room\". Ms. Rocco Aponte daughter was patient's caregiver. Ms. Rocco Aponte reports that patient is requiring increased care and her own family conditions have limited her ability to accept patient back. Patient's son is currently  from his wife and is renting a small room in a home where he cannot take care of patient. Patient will need long term care at discharge and has [place] [employer] [place]. Referrals sent to all [facility] SNF's via CC for mass search of accepting facility. Patient will not be a dialysis candidate per Nephrology. Care Management Interventions PCP Verified by CM: Yes(Listed as Joanna Guardado NP - patient unsure of last visit) Transition of Care Consult (CM Consult): Other(Initial CM Assessment) Le Signup: No 
Discharge Durable Medical Equipment: No(Per SNF - patient assisted in all ADL's due to blindness) Physical Therapy Consult: Yes Occupational Therapy Consult: Yes Speech Therapy Consult: No 
Current Support Network: Other(Patient previously at [facility] - does not want to return - current caregiver not accepting patient  back to the home) Plan discussed with Pt/Family/Caregiver: Yes(Informed patient that current caregiver could not accept her back - will need long term care as family unable to accept in home) Missy Moralez RN, BSN, ACM  - Medical Oncology [phone]

## 2018-11-14 NOTE — PROGRESS NOTES
Spiritual Care Assessment/Progress Note [address] 
 
 
NAME: Tanya Clark      MRN: [id number] AGE: 58 y.o. SEX: female Anabaptism Affiliation: Malinda Goldmann Language: English  
 
11/14/2018     Total Time (in minutes): 10 Spiritual Assessment begun in [facility]COL[facility]Y through conversation with: 
  
    [x]Patient        [] Family    [] Friend(s) Reason for Consult: Palliative Care, Initial/Spiritual Assessment Spiritual beliefs: (Please include comment if needed) [x] Identifies with a raul tradition:     
   [] Supported by a raul community:        
   [] Claims no spiritual orientation:       
   [] Seeking spiritual identity:            
   [] Adheres to an individual form of spirituality:       
   [] Not able to assess:                   
 
    
Identified resources for coping:  
   [x] Prayer                           
   [] Music                  [] Guided Imagery 
   [] Family/friends                 [] Pet visits [] Devotional reading                         [] Unknown 
   [] Other:                                       
 
 
Interventions offered during this visit: (See comments for more details) Patient Interventions: Affirmation of raul, Coping skills reviewed/reinforced, Prayer (assurance of), Iconic (affirming the presence of God/Higher Power) Plan of Care: 
 
 [x] Support spiritual and/or cultural needs  
 [] Support AMD and/or advance care planning process    
 [] Support grieving process 
 [] Coordinate Rites and/or Rituals  
 [] Coordination with community clergy 
 [x] No spiritual needs identified at this time 
 [] Detailed Plan of Care below (See Comments)  [] Make referral to Music Therapy 
[] Make referral to Pet Therapy    
[] Make referral to Addiction services 
[] Make referral to [facility] 
[] Make referral to Spiritual Care Partner 
[] No future visits requested       
[] Follow up visits as needed Comments:Patient was sleeping but woke up after I knocked and entered the room. I introduced myself to the patient and explained the function and role of chaplains in the hospital. The patient was cordial in her response. When asked about her need for support, the patient responded by saying prayer is always good. I asked the patient if she would like to pray before I leave. The patient declined but also added that she would be happy if just include her when I pray. [address] Myron Hernandez EdD, MDiv For [facility] Page [phone])

## 2018-11-14 NOTE — PROGRESS NOTES
Nephrology Progress Note Kyree Paige  
 
[email or web address]. [email or web address]                  Phone - [phone] Patient: Chidi Raya YOB: 1955     Admit Date: 11/12/2018 Date- 11/14/2018 CC: Follow up for arf on ckd Subjective: Interval History:  
-  She refused ivf and epogen yesterday 
bp high She took norvasc Palliative care input noted No c/o sob, chest pain, No c/o nausea or vomiting No c/o  fever. ROS:- as above Assessment: 1. Acute renal failure on chronic kidney disease, most likely due to progression of chronic kidney disease. 2.  Chronic kidney disease due to hypertension and diabetes. 3.  Type 2 diabetes. 4.  Hypertension. 5.  Anemia. 6.  Hypernatremia, likely due to dehydration. 7.  Noncompliance. 8. Legally blind Plan:  
· Increase norvasc 10 mg daily · Advise patient to take epogen for anemia · She doesn't want any ivf · She is not a dialysis candidate · Palliative care input noted and appreciated Physical Exam:  
GEN: NAD NECK- Supple, no thyromegaly RESP: Clear b/l, no wheezing, No accessory muscle use CVS: RRR,S1,S2 SKIN: No Rash, ABDO: soft , non tender, No hepatosplenomegaly EXT: No Edema NEURO: non focal, normal speech Care Plan discussed with: pt 
Objective:  
Patient Vitals for the past 24 hrs: 
 Temp Pulse Resp BP SpO2  
11/14/18 0733 98.3 °F (36.8 °C) 72 16 167/69 99 % 11/13/18 2348 98.2 °F (36.8 °C) 73 16 155/72 97 % 11/13/18 2029 98.8 °F (37.1 °C) 75 16 156/72 98 % 11/13/18 1648 98.9 °F (37.2 °C) 81 16 173/77 100 % Last 3 Recorded Weights in this Encounter 11/12/18 2206 Weight: 62 kg (136 lb 11 oz)  
 
11/12 1901 - 11/14 0700 In: -  
Out: 9685 [[id number]] Chart reviewed. Pertinent Notes reviewed. Medication list  reviewed Current Facility-Administered Medications Medication  amLODIPine (NORVASC) tablet 5 mg  [START ON 11/15/2018] amLODIPine (NORVASC) tablet 10 mg  
 0.45% sodium chloride infusion  hydrALAZINE (APRESOLINE) 20 mg/mL injection 20 mg  
 traMADol (ULTRAM) tablet 50 mg  
 sodium chloride (NS) flush 5-10 mL  sodium chloride (NS) flush 5-10 mL  acetaminophen (TYLENOL) tablet 650 mg  
 ondansetron (ZOFRAN) injection 4 mg  bisacodyl (DULCOLAX) tablet 5 mg  heparin (porcine) injection 5,000 Units  nitroglycerin (NITROBID) 2 % ointment 1 Inch  cloNIDine (CATAPRES) 0.2 mg/24 hr patch 1 Patch Data Review : 
Recent Labs 11/14/18 
0304 11/13/18 
0404 11/12/18 
1921 WBC 5.8 6.8 7.0 HGB 9.2* 8.3* 9.5*  173 209 ANEU 3.1  --  5.4 INR  --   --  1.0  145 147* K 4.3 4.3 5.1 GLU 71 167* 83 BUN 55* 53* 53* CREA 6.69* 6.87* 7.00* ALT 10* 9* 11* SGOT 9* 8* 11* TBILI 0.3 0.4 0.4 * 256* 299* CA 7.6* 7.6* 8.1*  
MG 2.0 2.1 2.3 PHOS 4.8* 4.3  --   
 
Lab Results Component Value Date/Time Culture result: NO SIGNIFICANT GROWTH 11/13/2018 04:04 AM  
 
No results found for: GREGORY Recent Labs 11/14/18 
0304 TIBC 226* PSAT 23 No results found for: Judy Foster MD 
[facility] 
 [email or web address] [place] [address] [employer] [address] [facility] [address], [address] Phone - [phone] Fax - [phone]

## 2018-11-14 NOTE — PROGRESS NOTES
Palliative Medicine Family Meeting Participants:  Pt., Son Nery Jaramillo, Palliative Team (Nessa Arita, Dr. Marissa Lindquist Discussion: 1. Patient was irritable today - a marked difference to her mood and affect yesterday which was pleasant and calm. Patient presented as angry, paranoid and fearful. She made vague references to illegal activities in nursing homes (people selling drugs) and she is very concerned about any new medication doctors prescribe 2. Discussed d/c plan of SNF to NH. 3. Dicussed patient health status and options for patient now that HD not an option. Discussed Epogen as a supportive medication to keep up blood count. Patient distressed at being prescribed a new medication without doctor discussing in full risks/benefits/side effects. Before she lost eyesight, she did her own research on medication and she is no longer able to do that for herself. Is very distrusting of medical establishment and drug testing, citing scientists doing testing on people and doctors lying. Discussed that there are laws in place now that restrict how drug companies can test and that her fears are valid based on past history of this (especially in [facility] community). Patient wanted to hear about Epogen and stated that she would want to pray on whether to take it and also talk to doctor about an alternative approach of increasing iron supplements to address anemia. Educated that even with supplementation iron would not provide the support she needs. Patient does not want to take Epogen at this time. Andre Sarabia left room with [facility] 1. Discussed MPOA with patient. She said she would want to  Appoint son Andre Sarabia as [place] and also would want him in room to sign document. 2. Dr. Priti Perez worked with patient to complete MPOA section of AMD with patient and educated son about hospice/LTC planning/what to expect going forward with illness. Outcome/Plan: Patient notified that LTC plan is still evolving and CM will touch base with them soon Dr. Sherley Moore returned to room to complete MPOA part of AMD naming son Andre Sarabia as MPOA. Code status also discussed VICK Garcia Palliative Medicine:  [phone] ([phone])

## 2018-11-14 NOTE — PROGRESS NOTES
Oncology Nursing Communication Tool 7:42 PM 
11/13/2018 Bedside shift change report given to [facility] , RN (incoming nurse) by Micky Murphy RN (outgoing nurse) on [employer]. Report included the following information SBAR. Shift Summary:  
  
 Issues for physician to address: Oncology Shift Note Admission Date 11/12/2018 Admission Diagnosis DYLAN (acute kidney injury) ([address] Code Status Full Code Consults IP CONSULT TO PALLIATIVE CARE - PROVIDER 
IP CONSULT TO NEPHROLOGY 
IP CONSULT TO PALLIATIVE CARE - PROVIDER Cardiac Monitoring [] Yes [] No  
  
Purposeful Hourly Rounding [x] Yes   
Richmond Score Total Score: 4 Richmond score 3 or > [] Bed Alarm [] Avasys [] 1:1 sitter [] Patient refused (Place signed refusal form in chart) Pain Managed [x] Yes [] No  
 Key Pain Meds The patient is on no pain meds. Influenza Vaccine Received Flu Vaccine for Current Season (usually Sept-March): No  
 Patient/Guardian Refused (Notify MD): Yes Oxygen needs? [] Room air Oxygen @  []1L    []2L    []3L   []4L    []5L   []6L Use home O2? [] Yes [] No 
Perform O2 challenge test using  smartphrase (.oxygenchallenge) Last bowel movement Last Bowel Movement Date: 11/12/18 
bowel movement Urinary Catheter LDAs Peripheral IV 11/12/18 Anterior; Inferior;Left;Lower;Proximal Arm (Active) Site Assessment Clean, dry, & intact 11/13/2018  7:45 AM  
Phlebitis Assessment 0 11/13/2018  7:45 AM  
Infiltration Assessment 0 11/13/2018  7:45 AM  
Dressing Status Clean, dry, & intact 11/13/2018  7:45 AM  
Dressing Type Tape;Transparent 11/13/2018  7:45 AM  
Hub Color/Line Status Infusing;Patent 11/12/2018 11:30 PM  
                  
  
Readmission Risk Assessment Tool Score High Risk   
      
 23 Total Score   
  
 4 Pt. Coverage (Medicare=5 , Medicaid, or Self-Pay=4) 19 Charlson Comorbidity Score (Age + Comorbid Conditions) Criteria that do not apply:  
 Has Seen PCP in Last 6 Months (Yes=3, No=0) . Living with Significant Other. Assisted Living. LTAC. SNF. or  
Rehab Patient Length of Stay (>5 days = 3) IP Visits Last 12 Months (1-3=4, 4=9, >4=11) Expected Length of Stay 2d 7h Actual Length of Stay 1 Lavelle Hill RN

## 2018-11-14 NOTE — CONSULTS
Palliative Medicine Consult [place]: [phone] ([phone]) Patient Name: Miguel A Garcia YOB: 1955 Date of Initial Consult: 11/13/18 Reason for Consult: end stage disease Requesting Provider: Dr. Elsa Delarosa Primary Care Physician: Frank Sanders NP 
 
 SUMMARY:  
Miguel A Garcia is a 58 y.o. with a past history of DM for >25years, with retinopathy (legally blind) neuropathy and nephropathy, now with ESRD has refused HD for past 2 years,HTN,  who was admitted on 11/12/2018 from home with a diagnosis of abdominal pain and nausea . Current medical issues leading to Palliative Medicine involvement include: debility from long term DM complicated by blindness, peripheral neuropathy and nephropathy/ ESRD, nausea due to uremia resolved with hydration, anemia of chronic renal disease. Renal consult notes that patient is not a candidate for HD, she has long hx of refusing HD and not following up with MD appointments. Patient is , she has 5 adult children (Beto Barker, [address] [place], Azucena Kyrie and [address]). She has been living with a friend Conrado Tobar (and her ), caregiver (? Medicaid) during day is Lisa's daughter, Ruben Hurley. In the past patient has been at the [facility] in [place]. PALLIATIVE DIAGNOSES:  
1. ESRD, still makes some urine, HD recommended 2 years ago, patient fairly stable since then 2. DM, complicated by retinopathy, nephropathy, neuropathy 3. Chronic bilateral hip pain 4. Hypoalbuminemia PLAN:  
1. Met with patient and son Azucena Mittal and his wife Rupa Pod 1. Patient difficult to remain on task today. Much discussion of past events, repetition about people lying, medication SE, etc. 
2. All understand and agree with plan for nursing home placement, likely SNF then transition to LTC. 3. All understand that dialysis is not an option, that kidneys are not working well, that life expectancy is limited but unknown. 4. Patient presents as a bit paranoid and has firmly fixed ideas/ concerns about taking ANY new medication. (Family says this is longstanding, nothing new) 5. I suggested that she take Epogen to help keep her blood count up - she does NOT want to take this medicine at this time, and wants to talk to Dr. Tommie Hughes about a higher dose of iron. She is fixated on the side effects that any medication will cause, and is unable to logically weight benefit/burden. 6. Patient suggested to our team yesterday that she would consider appointing Erica Branham as mPOA  We will try to readdress this and offer mPOA paperwork if she is willing prior to discharge. 7. Patient would benefit from hospice care in the future, she was resistant to that yesterday (she believes they give medication to make you die sooner), but will try to educate family about this option as something they can get later. 8. Communicated plan of care with: Palliative IDT, case management ADDENDUM Patient completed mPOA portion of AMD, appointing her son Diana Pearson as mPOA (no alternate) She did not complete other portions of form. Education provided about code status. Patient states when God says it's her time, she wants to go, would NOT want to be hooked to machines or have CPR. We talked about how CPR/Shock would not change her kidney failure. Discussed importance of DDNR form, in order to honor her wishes. She is not ready to sign today, as she says she needs to pray about it first.  Acknowledge how important these conversations are, and how Ericase Branham will know how to honor her wishes in the future, he is hearing her now say her wish to have a natural dying when that time comes. We talked about how they can get the form signed in the future if she wants it (here or at SNF)  Erica Branham is going to take the blank DDNR with him, so he can approach MD at SNF if needed if she is ready to sign later. She remains FULL CODE for now We talked about her fears about hospice, encouraged her that other hospices (different than the one that treated her mother) could be asked to help with [facility] [place] [facility] Discussed decisions in future that Dmitry Burch will help with, what to interventions to protect her from, and when she no longer wants trips to the hospital. Selvin Mock I gave Dmitry Burch my card and encouraged him to call me this afternoon if he thinks of other questions. GOALS OF CARE / TREATMENT PREFERENCES:  
 
GOALS OF CARE: 
Patient/Health Care Proxy Stated Goals: Prolong life TREATMENT PREFERENCES:  
Code Status: Full Code Code status not yet discussed by our team  
Advance Care Planning: 
[] The [place] [id number] Team has updated the ACP Navigator with [address] and Patient Capacity Primary Decision Maker ([address]): 5 adult children are [facility] Relationship to patient: 
Phone number: 
[] Named in a scanned document  
[x] Legal Next of Kin 
[] Guardian Secondary Decision Maker (First Alternate Health Care Agent):  
Relationship to patient: 
Phone number: 
[] Named in a scanned document  
[] Legal Next of Kin 
[] Guardian Medical Interventions: Limited additional interventions Other Instructions: Other: As far as possible, the palliative care team has discussed with patient / health care proxy about goals of care / treatment preferences for patient. HISTORY:  
 
History obtained from: chart, patient CHIEF COMPLAINT: admitted with nausea HPI/SUBJECTIVE: The patient is:  
[x] Verbal and participatory [] Non-participatory due to:  
 
58year old female with known ESRD (has refused HD always in past), who was admitted with nausea, abdominal pain 11/14  Feeling 100% better than time of admission. Sore throat for a few minutes, gone now Walked with walker. Clinical Pain Assessment (nonverbal scale for severity on nonverbal patients):  
Clinical Pain Assessment Severity: 0 Duration: for how long has pt been experiencing pain (e.g., 2 days, 1 month, years) Frequency: how often pain is an issue (e.g., several times per day, once every few days, constant) FUNCTIONAL ASSESSMENT:  
 
Palliative Performance Scale (PPS): PPS: 40 PSYCHOSOCIAL/SPIRITUAL SCREENING:  
 
Palliative IDT has assessed this patient for cultural preferences / practices and a referral made as appropriate to needs (Cultural Services, Patient Advocacy, Ethics, etc.) Any spiritual / Rastafarian concerns: 
[] Yes /  [x] No 
 
Caregiver Burnout: 
[] Yes /  [] No /  [x] No Caregiver Present Anticipatory grief assessment:  
[x] Normal  / [] Maladaptive ESAS Anxiety: Anxiety: 0 
 
ESAS Depression: Depression: 0 REVIEW OF SYSTEMS:  
 
Positive and pertinent negative findings in ROS are noted above in HPI. The following systems were [x] reviewed / [] unable to be reviewed as noted in HPI Other findings are noted below. Systems: constitutional, ears/nose/mouth/throat, respiratory, gastrointestinal, genitourinary, musculoskeletal, integumentary, neurologic, psychiatric, endocrine. Positive findings noted below. Modified ESAS Completed by: provider Fatigue: 0 Drowsiness: 0 Depression: 0 Pain: 0 Anxiety: 0 Nausea: 0 Anorexia: 3 Dyspnea: 0 Constipation: No  
     
 
 
 PHYSICAL EXAM:  
 
From RN flowsheet: 
Wt Readings from Last 3 Encounters:  
11/12/18 62 kg (136 lb 11 oz) 04/16/18 63.8 kg (140 lb 9.6 oz)  
03/27/18 68.5 kg (151 lb 0.2 oz) Blood pressure 167/69, pulse 72, temperature 98.3 °F (36.8 °C), resp. rate 16, height 5' 4\" (1.626 m), weight 62 kg (136 lb 11 oz), SpO2 99 %, not currently breastfeeding. Pain Scale 1: Numeric (0 - 10) Pain Intensity 1: 0 Last bowel movement, if known:  
 
Constitutional: patient is awake, alert NAD Blind Engaging in conversation No respiratory distress Sitting on side of bed combing her hair Affect normal, cooperative Insight is good mostly, although some misperceptions about causality of coincidental past medical events. HISTORY:  
 
Active Problems: 
  DYLAN (acute kidney injury) ([facility] [address] (11/12/2018) Past Medical History:  
Diagnosis Date  Arthritis  Chronic kidney disease 2018  
  stage 5/ not dialysis patient  Chronic pain   
 from arthritis  Diabetes ([facility] [address]   
 no meds  Diabetic neuropathy ([facility] [address]  Diabetic retinopathy ([facility] [address]  HTN (hypertension)  Legally blind Past Surgical History:  
Procedure Laterality Date  HX GYN    
 tubal ligation  VASCULAR SURGERY PROCEDURE UNLIST  4/6/16 Creation of AV fistula right arm Family History Problem Relation Age of Onset  Stroke Mother  Hypertension Mother  Diabetes Father  Cancer Sister  Stroke Sister  Diabetes Brother   
     2 brothers  Diabetes Paternal Grandmother History reviewed, no pertinent family history. Social History Tobacco Use  Smoking status: Never Smoker  Smokeless tobacco: Never Used Substance Use Topics  Alcohol use: No  
 
Allergies Allergen Reactions  Demerol [Meperidine] Swelling Other reaction(s): edema Current Facility-Administered Medications Medication Dose Route Frequency  0.45% sodium chloride infusion  75 mL/hr IntraVENous CONTINUOUS  
 amLODIPine (NORVASC) tablet 5 mg  5 mg Oral DAILY  hydrALAZINE (APRESOLINE) 20 mg/mL injection 20 mg  20 mg IntraVENous Q6H PRN  
 traMADol (ULTRAM) tablet 50 mg  50 mg Oral Q12H PRN  
 sodium chloride (NS) flush 5-10 mL  5-10 mL IntraVENous Q8H  
 sodium chloride (NS) flush 5-10 mL  5-10 mL IntraVENous PRN  
 acetaminophen (TYLENOL) tablet 650 mg  650 mg Oral Q4H PRN  
 ondansetron (ZOFRAN) injection 4 mg  4 mg IntraVENous Q4H PRN  
 bisacodyl (DULCOLAX) tablet 5 mg  5 mg Oral DAILY PRN  
 heparin (porcine) injection 5,000 Units  5,000 Units SubCUTAneous Q12H  nitroglycerin (NITROBID) 2 % ointment 1 Inch  1 Inch Topical Q6H PRN  
 cloNIDine (CATAPRES) 0.2 mg/24 hr patch 1 Patch  1 Patch TransDERmal Q7D  
 
 
 
 LAB AND IMAGING FINDINGS:  
 
Lab Results Component Value Date/Time WBC 5.8 11/14/2018 03:04 AM  
 HGB 9.2 (L) 11/14/2018 03:04 AM  
 PLATELET 010 95/64/1499 03:04 AM  
 
Lab Results Component Value Date/Time Sodium 142 11/14/2018 03:04 AM  
 Potassium 4.3 11/14/2018 03:04 AM  
 Chloride 111 (H) 11/14/2018 03:04 AM  
 CO2 22 11/14/2018 03:04 AM  
 BUN 55 (H) 11/14/2018 03:04 AM  
 Creatinine 6.69 (H) 11/14/2018 03:04 AM  
 Calcium 7.6 (L) 11/14/2018 03:04 AM  
 Magnesium 2.0 11/14/2018 03:04 AM  
 Phosphorus 4.8 (H) 11/14/2018 03:04 AM  
  
Lab Results Component Value Date/Time AST (SGOT) 9 (L) 11/14/2018 03:04 AM  
 Alk. phosphatase 265 (H) 11/14/2018 03:04 AM  
 Protein, total 6.6 11/14/2018 03:04 AM  
 Albumin 2.8 (L) 11/14/2018 03:04 AM  
 Globulin 3.8 11/14/2018 03:04 AM  
 
Lab Results Component Value Date/Time INR 1.0 11/12/2018 07:21 PM  
 Prothrombin time 10.2 11/12/2018 07:21 PM  
  
No results found for: IRON, FE, TIBC, IBCT, PSAT, FERR No results found for: PH, PCO2, PO2 No components found for: [place] No results found for: CPK, CKMB Total time: 75min Counseling / coordination time, spent as noted above: 55 
> 50% counseling / coordination?: yes Prolonged service was provided for  []30 min   []75 min in face to face time in the presence of the patient, spent as noted above. Time Start:  
Time End:  
Note: this can only be billed with 57180 (initial) or 05524 (follow up). If multiple start / stop times, list each separately.
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
## 2018-11-14 NOTE — PROGRESS NOTES
Hospitalist Progress Note NAME: Frankie Snowden :  1955 MRN:  [id number] Assessment / Plan: 
DYLAN in setting of CKD5 with dehydration (nausea and L sided abdominal pain x2 weeks) - DYLAN improving, abdominal pain resolved: has never been on HD before.  Previously saw Dr. 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 - IV fluids - check lipase with next blood draw 
- consider CT A/P in AM pending clinical course 
- nephrology consulted; general sugery consulted regarding fistula : 
Cr improved marginally. Appreciate Nephrology eval. Lipase wnl. Abdo pain resolved - will not therefore pursue CT A/P. Encourage PO intake. : 
Pt tolerating regular PO. DYLAN marginally improved. Palliative Care consult noted - family meeting for today. Appreciate Nephrology eval.  
  
Hypertension, benign/essential:  Stopped all her meds some time ago 
- clonidine patch ordered to try and improve compliance - prn nitrobid 
  
: Will continue Clonidine patch for now  
  
Hypoglycemia:  Likely nutritional 
- regular diet ordered 
- monitor glucose : 
AM glucose 167 - will continue monitoring. Encourage PO intake. 
  
18.5 - 24.9 Normal weight / Body mass index is 23.46 kg/m². 
  
Code status: Full Prophylaxis: Hep SQ Recommended Disposition: TBD Subjective: Chief Complaint / Reason for Physician Visit Pt worried about dialysis and it's consequences. Attempted to allay pt's concerns by telling her that there was a plan to discuss with family about the goals of care. Pt verbalized her understanding. .  Discussed with RN events overnight. Review of Systems: 
Symptom Y/N Comments  Symptom Y/N Comments Fever/Chills n   Chest Pain n   
Poor Appetite n   Edema Cough    Abdominal Pain n   
Sputum    Joint Pain SOB/NOGUERA    Pruritis/Rash Nausea/vomit n   Tolerating PT/OT Diarrhea    Tolerating Diet y Constipation    Other Could NOT obtain due to: Objective: VITALS:  
Last 24hrs VS reviewed since prior progress note. Most recent are: 
Patient Vitals for the past 24 hrs: 
 Temp Pulse Resp BP SpO2  
11/14/18 0733 98.3 °F (36.8 °C) 72 16 167/69 99 % 11/13/18 2348 98.2 °F (36.8 °C) 73 16 155/72 97 % 11/13/18 2029 98.8 °F (37.1 °C) 75 16 156/72 98 % 11/13/18 1648 98.9 °F (37.2 °C) 81 16 173/77 100 % Intake/Output Summary (Last 24 hours) at 11/14/2018 1104 Last data filed at 11/14/2018 9055 Gross per 24 hour Intake  Output 1200 ml Net -1200 ml PHYSICAL EXAM: 
General:          WD, WN. Alert, cooperative, no acute distress   
EENT:              Anicteric sclerae, +legally alf Resp:               CTA bilaterally, no wheezing or rales. No accessory muscle use CV:                  Regular  rhythm,  No edema GI:                   Soft, Non distended, Non tender.  +Bowel sounds Neurologic:      Alert and oriented X 3, normal speech, Psych:             Fair insight. Not anxious nor agitated Skin:                No rashes. No jaundice Reviewed most current lab test results and cultures  YES Reviewed most current radiology test results   YES Review and summation of old records today    NO Reviewed patient's current orders and MAR    YES 
PMH/SH reviewed - no change compared to H&P 
________________________________________________________________________ Care Plan discussed with: 
  Comments Patient x Family RN x Care Manager x Consultant Multidiciplinary team rounds were held today with , nursing, pharmacist and clinical coordinator. Patient's plan of care was discussed; medications were reviewed and discharge planning was addressed. ________________________________________________________________________ Total NON critical care TIME:  25   Minutes Total CRITICAL CARE TIME Spent:   Minutes non procedure based Comments >50% of visit spent in counseling and coordination of care    
________________________________________________________________________ Felecia Serra MD  
 
Procedures: see electronic medical records for all procedures/Xrays and details which were not copied into this note but were reviewed prior to creation of Plan. LABS: 
I reviewed today's most current labs and imaging studies. Pertinent labs include: 
Recent Labs 11/14/18 
0304 11/13/18 
0404 11/12/18 
1921 WBC 5.8 6.8 7.0 HGB 9.2* 8.3* 9.5* HCT 30.4* 27.3* 30.5*  173 209 Recent Labs 11/14/18 
0304 11/13/18 
0404 11/12/18 
1921  145 147* K 4.3 4.3 5.1 * 112* 115* CO2 22 24 27 GLU 71 167* 83 BUN 55* 53* 53* CREA 6.69* 6.87* 7.00* CA 7.6* 7.6* 8.1*  
MG 2.0 2.1 2.3 PHOS 4.8* 4.3  --   
ALB 2.8* 2.7* 3.1* TBILI 0.3 0.4 0.4 SGOT 9* 8* 11* ALT 10* 9* 11* INR  --   --  1.0 Signed: Felecia Serra MD

## 2018-11-15 NOTE — PROGRESS NOTES
Problem: Falls - Risk of 
Goal: *Absence of Falls Document Mary Elliott Fall Risk and appropriate interventions in the flowsheet. Fall Risk Interventions: 
Mobility Interventions: Communicate number of staff needed for ambulation/transfer Elimination Interventions: Call light in reach History of Falls Interventions: Door open when patient unattended

## 2018-11-15 NOTE — PROGRESS NOTES
Orders received and chart reviewed. Pt in bed upon arrival, reporting, \"I am leaving today. .. I don't feel like walking around now. .. My stomach, my back hurts. \"  Pt educated on importance of OOB/mobility efforts. .. Pt still adamantly declines. .. Pt reports 0/10 pain at rest... \"Only when I move around. \"  Nurse notified. Will f/u as able.

## 2018-11-15 NOTE — PROGRESS NOTES
Oncology Interdisciplinary rounds were held today to discuss patient plan of care and outcomes. The following members were present: Nursing, Case Management, Pharmacy and Dietary. Actual Length of Stay: 3 DRG GLOS: 2.3 Expected Length of Stay: 2d 7h 
 
 
 
               Plan               Mobility         Discharge Plan Continue current plan Up with assistance Skilled nursing facility 11/17

## 2018-11-15 NOTE — PROGRESS NOTES
Problem: Self Care Deficits Care Plan (Adult) Goal: *Acute Goals and Plan of Care (Insert Text) Occupational Therapy Goals Initiated 11/13/2018 1. Patient will perform grooming in standing VSS with supervision/set-up within 7 day(s). 2.  Patient will perform bathing with supervision/set-up within 7 day(s). 3.  Patient will perform upper body dressing and lower body dressing with supervision/set-up within 7 day(s). 4.  Patient will perform toilet transfers with supervision/set-up within 7 day(s). 5.  Patient will perform all aspects of toileting with supervision/set-up within 7 day(s). 6.  Patient will participate in upper extremity therapeutic exercise/activities with supervision/set-up for 5 minutes within 7 day(s). 7.  Patient will utilize energy conservation, fall prevention techniques with visual impairments during functional activities with verbal cues within 7 day(s). Occupational Therapy TREATMENT Patient: Lia Palm (34 y.o. female) Date: 11/15/2018 Diagnosis: DYLAN (acute kidney injury) ([facility] [address] <principal problem not specified> Precautions: Fall(legally blind) Chart, occupational therapy assessment, plan of care, and goals were reviewed. ASSESSMENT: 
Patient received seated before full breakfast tray stating she was not hungry. Willing to participate in therapy where she required S-CGA for LE ADLs, limited by 1 loss of balance backward to L which she was able to recover from from with VC only. Patient Trained in B UE AROM HEP which she is encouraged to perform in small increments throughout the day; she is able to demonstrate no pain in B UEs with full AROM, but does c/o fatigue and mild back pain with activity and bed mobility today as well as decreased appetite as noted above. Patient will need 24/7 care at discharge SNF vs family/friend. Continue to recommend [facility] Vis[facility] Impaired referral. 
Progression toward goals: [x]       Improving appropriately and progressing toward goals 
[]       Improving slowly and progressing toward goals 
[]       Not making progress toward goals and plan of care will be adjusted PLAN: 
Patient continues to benefit from skilled intervention to address the above impairments. Continue treatment per established plan of care. Discharge Recommendations:  [address] and To Be Determined Further Equipment Recommendations for Discharge:  TBA SUBJECTIVE:  
Patient stated My appetite is just off today. I dont feel very good.  OBJECTIVE DATA SUMMARY:  
Cognitive/Behavioral Status: 
Neurologic State: Alert Orientation Level: Oriented to place;Oriented to person;Disoriented to time;Disoriented to situation Cognition: Follows commands; Impaired decision making Perception: (legally blind) Perseveration: Perseverates during conversation Safety/Judgement: Decreased insight into deficits; Fall prevention Functional Mobility and Transfers for ADLs:Bed Mobility: 
Rolling: Modified independent Supine to Sit: Modified independent Sit to Supine: Modified independent Scooting: Modified independent Transfers: 
Sit to Stand: Supervision;Contact guard assistance Functional Transfers Toilet Transfer : Stand-by assistance; Additional time; Adaptive equipment;Supervision(vc) 
Bed to Chair: Supervision;Contact guard assistance Balance: 
Sitting: Intact Standing: Impaired Standing - Static: Good Standing - Dynamic : Fair ADL Intervention: 
Feeding Feeding Assistance: Supervision/set-up(poor appetite noted) Grooming Grooming Assistance: Supervision/set up Lower Body Dressing Assistance Dressing Assistance: Minimum assistance Protective Undergarmet: Minimum assistance Socks: Supervision/set-up Position Performed: Standing;Seated edge of bed Toileting Toileting Assistance: Stand-by assistance(to BSC) Cognitive Retraining Attention to Task: Single task Maintains Attention For (Time): Greater than 10 minutes Following Commands: Follows two step commands/directions; Follows one step commands/directions Safety/Judgement: Decreased insight into deficits; Fall prevention Therapeutic Exercises:  
Full AROM, B UE, all joints, use of \"air alphabet\" so she has way to grade her activity tolerance; goal is to perform throughout the day, working to tolerating full alphabet; Able to perform to let M; encouraged 1 arm at a time to maximize end range AROM with better concentration but she consistently performs with B UEs. She is aware that she can advance/grade this HEP by building \"words\" in category which will also \"exercise my brain\"Pain: 
  
 vague c/o back pain during bed mobility but not able to articulate a number Activity Tolerance:  
Decreased today but still willing to work Please refer to the flowsheet for vital signs taken during this treatment. After treatment:  
[] Patient left in no apparent distress sitting up in chair 
[x] Patient left in no apparent distress in bed 
[x] Call bell left within reach [x] Nursing notified 
[] Caregiver present 
[] Bed alarm activated COMMUNICATION/COLLABORATION:  
The patients plan of care was discussed with: Physical Therapist and Registered Nurse Pj Cosby OTR/L Time Calculation: 23 mins

## 2018-11-15 NOTE — PROGRESS NOTES
Advised by physician that pt is ready for discharge today to nursing home today. [facility], spoke with admissions staff Faustina Becerra. Faustina Becerra advised he has not yet been able to verify her medicaid. [place] [facility] [place] and Faustina Becerra is verifying this now. He also is advising CM that he must come have a conversation at bedside with pt BEFORE accepting her and also speaking with  in [place] to determine if pt has a balance. Faustina Becerra will notify CM as soon as above is addressed. He is aware that pt is medically cleared for discharge today. Will follow. Yolande Collado, MSW 
 
 
10:30am  Spoke with pt at bedside. Long discussion regarding transitions of care needs. Pt is in agreement to SNF discharge, medicaid bed. Discussed payment to nursing home. Listened to pt's concern. Pt has adult children in area but only reports having one son Manuel Coelho and her daughter in law Hesham Cabrera who visits. She completed AMD with Joana Bhandari as MPOA. She understands and is willing to meet with admissions staff Faustina Becerra from [facility]. Encouraged her to ask questions if she has any. Babs Aceves, MSW 
 
 
11am  After further review, [facility] is not committing to offering pt a bed today and accepting ehr. Answered questions with Faustina Becerra as able. He will communicate further decision on availability to accept possibly later today. CM also reached out to Pretty, admissions liason for [facility] (john and [facility]eb still pending responses in CC link), phone [phone], and reviewed pt's needs for LTC. He will review referral as well as reach out to azeb and john admissions directors. He plans to meet pt here tomorrow morning at the latest. 
 
As of current, do not have a committed facility to accept pt. Will follow. Yolande Collado, MSW 2pm  Tracie, admissions director from  [facility] met with pt at bedside for assessment. Outcome pending with decision.  
 
Leopoldo Flavin, ZI

## 2018-11-15 NOTE — PROGRESS NOTES
Hospitalist Progress Note NAME: Murray Rivas :  1955 MRN:  [id number] Assessment / Plan: 
DYLAN in setting of CKD5 with dehydration (nausea and L sided abdominal pain x2 weeks) - DYLAN improving, abdominal pain resolved: has never been on HD before.  Previously saw Dr. 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 - IV fluids - check lipase with next blood draw 
- consider CT A/P in AM pending clinical course 
- nephrology consulted; general sugery consulted regarding fistula : 
Cr improved marginally. Appreciate Nephrology eval. Lipase wnl. Abdo pain resolved - will not therefore pursue CT A/P. Encourage PO intake. : 
Pt tolerating regular PO. DYLAN marginally improved. Palliative Care consult noted - family meeting for today. Appreciate Nephrology eval.  
 
11/15: 
[address] Nephrology eval - pt not a candidate for dialysis. Choice of SNF not possible. Pt's chart has been referred to other facilities. Dispo pending.  
  
Hypertension, benign/essential:  Stopped all her meds some time ago 
- clonidine patch ordered to try and improve compliance - prn nitrobid 
  
: Will continue Clonidine patch for now  
 
11/15: 
Norvasc added to regimen 
  
Hypoglycemia:  Likely nutritional 
- regular diet ordered 
- monitor glucose : 
AM glucose 167 - will continue monitoring. Encourage PO intake. 
  
18.5 - 24.9 Normal weight / Body mass index is 23.46 kg/m². 
  
Code status: Full Prophylaxis: Hep SQ Recommended Disposition: TBD Subjective: Chief Complaint / Reason for Physician Visit Feels well, denies any complaints. Discussed with RN events overnight. Review of Systems: 
Symptom Y/N Comments  Symptom Y/N Comments Fever/Chills n   Chest Pain n   
Poor Appetite n   Edema Cough n   Abdominal Pain n   
Sputum    Joint Pain SOB/NOGUERA n   Pruritis/Rash Nausea/vomit    Tolerating PT/OT Diarrhea    Tolerating Diet Constipation    Other Could NOT obtain due to:   
 
Objective: VITALS:  
Last 24hrs VS reviewed since prior progress note. Most recent are: 
Patient Vitals for the past 24 hrs: 
 Temp Pulse Resp BP SpO2  
11/15/18 0900  86   96 % 11/15/18 0800 98.1 °F (36.7 °C) 81 16 155/69 94 % 11/15/18 0400 98.5 °F (36.9 °C) 80 18 140/68 100 % 11/14/18 1940 98.9 °F (37.2 °C) 79 18 145/65 98 % 11/14/18 1533 98.5 °F (36.9 °C) 77 18 161/74 100 % No intake or output data in the 24 hours ending 11/15/18 1312 PHYSICAL EXAM: 
General: Alert, cooperative, no acute distress   
EENT:  +Legally blind Resp:  CTA bilaterally, no wheezing or rales. No accessory muscle use CV:  Regular  rhythm,  No edema GI:  Soft, Non distended, Non tender.  +Bowel sounds Neurologic:  Alert and oriented X 3, normal speech, Psych:   Fair insight. Not anxious nor agitated Skin:  No rashes. No jaundice Reviewed most current lab test results and cultures  YES Reviewed most current radiology test results   YES Review and summation of old records today    NO Reviewed patient's current orders and MAR    YES 
PMH/SH reviewed - no change compared to H&P 
________________________________________________________________________ Care Plan discussed with: 
  Comments Patient x Family RN x Care Manager Consultant Multidiciplinary team rounds were held today with , nursing, pharmacist and clinical coordinator. Patient's plan of care was discussed; medications were reviewed and discharge planning was addressed. ________________________________________________________________________ Total NON critical care TIME:  25   Minutes Total CRITICAL CARE TIME Spent:   Minutes non procedure based Comments >50% of visit spent in counseling and coordination of care    
________________________________________________________________________ Unruly Oakes MD  
 
 Procedures: see electronic medical records for all procedures/Xrays and details which were not copied into this note but were reviewed prior to creation of Plan. LABS: 
I reviewed today's most current labs and imaging studies. Pertinent labs include: 
Recent Labs 11/15/18 
7981 11/14/18 
0304 11/13/18 
0404 WBC 5.0 5.8 6.8 HGB 8.4* 9.2* 8.3* HCT 28.1* 30.4* 27.3*  
 175 173 Recent Labs 11/15/18 
0342 11/14/18 
0304 11/13/18 
0404 11/12/18 
1921  142 145 147*  
K 5.1 4.3 4.3 5.1 * 111* 112* 115* CO2 22 22 24 27 GLU 72 71 167* 83 BUN 57* 55* 53* 53* CREA 7.37* 6.69* 6.87* 7.00* CA 7.2* 7.6* 7.6* 8.1*  
MG  --  2.0 2.1 2.3 PHOS 5.0* 4.8* 4.3  --   
ALB 2.7* 2.8* 2.7* 3.1* TBILI  --  0.3 0.4 0.4 SGOT  --  9* 8* 11* ALT  --  10* 9* 11* INR  --   --   --  1.0 Signed: Jerel Chiu MD

## 2018-11-16 NOTE — PROGRESS NOTES
Problem: Falls - Risk of 
Goal: *Absence of Falls Document Arcenio Gordon Fall Risk and appropriate interventions in the flowsheet. Outcome: Progressing Towards Goal 
Fall Risk Interventions: 
Mobility Interventions: Bed/chair exit alarm, Communicate number of staff needed for ambulation/transfer, Patient to call before getting OOB, Utilize walker, cane, or other assistive device Medication Interventions: Bed/chair exit alarm, Evaluate medications/consider consulting pharmacy, Patient to call before getting OOB, Teach patient to arise slowly Elimination Interventions: Bed/chair exit alarm, Call light in reach, Patient to call for help with toileting needs, Toileting schedule/hourly rounds History of Falls Interventions: Bed/chair exit alarm, Door open when patient unattended, Evaluate medications/consider consulting pharmacy, Room close to nurse's station

## 2018-11-16 NOTE — PROGRESS NOTES
Hospitalist Progress Note NAME: Brodie Romberg :  1955 MRN:  [id number] Assessment / Plan: 
DYLAN in setting of CKD5 with dehydration (nausea and L sided abdominal pain x2 weeks) - DYLAN improving, abdominal pain resolved: has never been on HD before.  Previously saw Dr. Jarod Preston and has a nonworking fisula in her RUE. - IV fluids - check lipase with next blood draw 
- consider CT A/P in AM pending clinical course 
- nephrology consulted; general sugery consulted regarding fistula : 
Cr improved marginally. Appreciate Nephrology eval. Lipase wnl. Abdo pain resolved - will not therefore pursue CT A/P. Encourage PO intake. : 
Pt tolerating regular PO. DYLAN marginally improved. Palliative Care consult noted - family meeting for today. Appreciate Nephrology eval.  
 
11/15: 
[address] Nephrology eval - pt not a candidate for dialysis. Choice of SNF not possible. Pt's chart has been referred to other facilities. Dispo pending. : 
Pending SNF placement.  
  
Hypertension, benign/essential:  Stopped all her meds some time ago 
- clonidine patch ordered to try and improve compliance - prn nitrobid 
  
: Will continue Clonidine patch for now  
  
11/15: 
Norvasc added to regimen 
  
Hypoglycemia:  Likely nutritional 
- regular diet ordered 
- monitor glucose : 
AM glucose 167 - will continue monitoring. Encourage PO intake. 
  
18.5 - 24.9 Normal weight / Body mass index is 23.46 kg/m². 
  
Code status: Full Prophylaxis: Hep SQ Recommended Disposition: SNF Subjective: Chief Complaint / Reason for Physician Visit Feels well. Discussed with RN events overnight. Review of Systems: 
Symptom Y/N Comments  Symptom Y/N Comments Fever/Chills n   Chest Pain n   
Poor Appetite n   Edema Cough n   Abdominal Pain n   
Sputum    Joint Pain SOB/NOGUERA n   Pruritis/Rash Nausea/vomit    Tolerating PT/OT Diarrhea    Tolerating Diet Constipation    Other Could NOT obtain due to:   
 
Objective: VITALS:  
Last 24hrs VS reviewed since prior progress note. Most recent are: 
Patient Vitals for the past 24 hrs: 
 Temp Pulse Resp BP SpO2  
11/16/18 0800 99 °F (37.2 °C) 81 16 145/61 96 % 11/15/18 2245 98.5 °F (36.9 °C) 80 16 147/56 95 % 11/15/18 2005 98.9 °F (37.2 °C) 76 18 151/60 95 % 11/15/18 1600 98.3 °F (36.8 °C) 80 18 145/71 96 % Intake/Output Summary (Last 24 hours) at 11/16/2018 1513 Last data filed at 11/16/2018 8535 Gross per 24 hour Intake  Output 600 ml Net -600 ml PHYSICAL EXAM: 
General:          Alert, cooperative, no acute distress   
EENT:              +blind Resp:               CTA bilaterally, no wheezing or rales. No accessory muscle use CV:                  Regular  rhythm,  No edema GI:                   Soft, Non distended, Non tender.  +Bowel sounds Neurologic:      Alert and oriented X 3, normal speech, Psych:             Fair insight. Not anxious nor agitated Skin:                No rashes. No jaundice Reviewed most current lab test results and cultures  YES Reviewed most current radiology test results   YES Review and summation of old records today    NO Reviewed patient's current orders and MAR    YES 
PMH/ reviewed - no change compared to H&P 
________________________________________________________________________ Care Plan discussed with: 
  Comments Patient x Family RN x Care Manager Consultant Multidiciplinary team rounds were held today with , nursing, pharmacist and clinical coordinator. Patient's plan of care was discussed; medications were reviewed and discharge planning was addressed. ________________________________________________________________________ Total NON critical care TIME:  25   Minutes Total CRITICAL CARE TIME Spent:   Minutes non procedure based Comments >50% of visit spent in counseling and coordination of care    
________________________________________________________________________ Felecia Serra MD  
 
Procedures: see electronic medical records for all procedures/Xrays and details which were not copied into this note but were reviewed prior to creation of Plan. LABS: 
I reviewed today's most current labs and imaging studies. Pertinent labs include: 
Recent Labs 11/16/18 
0100 11/15/18 
0342 11/14/18 
0304 WBC 5.4 5.0 5.8 HGB 9.0* 8.4* 9.2* HCT 29.5* 28.1* 30.4*  175 175 Recent Labs 11/16/18 
0100 11/15/18 
0342 11/14/18 
0304  143 142  
K 4.3 5.1 4.3 * 112* 111* CO2 23 22 22 GLU 94 72 71 BUN 54* 57* 55* CREA 7.28* 7.37* 6.69* CA 7.3* 7.2* 7.6*  
MG  --   --  2.0 PHOS 4.3 5.0* 4.8* ALB 2.9* 2.7* 2.8* TBILI  --   --  0.3 SGOT  --   --  9* ALT  --   --  10* Signed: Felecia Serra MD

## 2018-11-16 NOTE — PROGRESS NOTES
Physical Therapy Note Chart reviewed. Pt cleared by nursing for mobility. Attempted to see patient x 1 this afternoon, however patient in the bathroom on initial attempt. Found patient still in the bathroom 20 minutes later. Patient observed in the bathroom performing toileting hygiene and ADLs at sink with supervision, needing VCs to find soap and towels. Pt adamantly declining PT intervention due to increased back pain. Pt was assisted back into room into bed with SBA and HHA due to visual impairments. Attempting to get herself out of the bathroom by \"furniture\" walking, noting that she needs to learn how to get to/from the bathroom here in the hospital (reporting that she ambulated into the bathroom alone). Educated pt on the importance of calling for nursing for all mobility due to her visual impairments and being in unfamiliar environment. Pt verbalized understanding. Upon returning patient to bed, noticed that patient's bed alarm was not working despite being activated. Nursing notified immediately. Pt was left supine in bed with all needs met and RN aware. Therapy session aborted as patient refusing additional mobility due to back pain. Will follow back Monday. Thank you, 
Kalen Hill, PT, DPT Total time: 8 minutes

## 2018-11-16 NOTE — PROGRESS NOTES
Palliative Medicine Social Work Spoke with daughter in law Flory Minaya over the phone. She requested information regarding where Mrs. Moreira will be placed. Updated her that [facility] was still looking for a bed. Thank you for the opportunity to be involved in the care of Mrs. Mickey Montero and her family. Joshua Kennedy, \[facility]\"" Palliative Medicine  [phone]

## 2018-11-16 NOTE — PROGRESS NOTES
Nephrology Progress Note Kyree Paige  
 
[email or web address]                  Phone - [phone] Patient: Jl Garrido YOB: 1955     Admit Date: 11/12/2018 Date- 11/16/2018 CC: Follow up for arf on ckd Subjective: Interval History:  
-cr high but stable 
bp good Hb improved No c/o sob, chest pain, No c/o nausea or vomiting No c/o  fever. ROS:- as above Assessment: 1.  arf on chronic kidney disease, most likely due to progression of chronic kidney disease. The renal function is worse with the Bun/creat up to 57/7.4.   
2.  Chronic kidney disease due to hypertension and diabetes. 3.  Type 2 diabetes. 4.  Hypertension. 5.  anemia 6. Hypernatremia, likely due to dehydration. 7.  Noncompliance. 8. Legally blind Plan: · She is not a dialysis candidate. · Palliative care input noted and appreciated · Waiting for placement · Nothing much to add. Call us if needed. Physical Exam:  
GEN: elderly woman in NAD NECK- supple, no thyromegaly RESP: clear b/l no wheezing, No accessory muscle use CVS: RRR; no gallop or rub SKIN: No Rash, ABDO: soft , non tender, No hepatosplenomegaly No edema Care Plan discussed with: pt 
Objective:  
Patient Vitals for the past 24 hrs: 
 Temp Pulse Resp BP SpO2  
11/16/18 1524 98.8 °F (37.1 °C) 76 16 144/60 98 % 11/16/18 0800 99 °F (37.2 °C) 81 16 145/61 96 % 11/15/18 2245 98.5 °F (36.9 °C) 80 16 147/56 95 % 11/15/18 2005 98.9 °F (37.2 °C) 76 18 151/60 95 % Last 3 Recorded Weights in this Encounter 11/12/18 2206 11/16/18 0750 Weight: 62 kg (136 lb 11 oz) 61.7 kg (136 lb)  
 
11/14 1901 - 11/16 0700 In: -  
Out: 996 [DIDRU:996] Chart reviewed. Pertinent Notes reviewed. Medication list  reviewed Current Facility-Administered Medications Medication  amLODIPine (NORVASC) tablet 10 mg  
  epoetin jackelyn (EPOGEN;PROCRIT) injection 10,000 Units  hydrALAZINE (APRESOLINE) 20 mg/mL injection 20 mg  
 traMADol (ULTRAM) tablet 50 mg  
 sodium chloride (NS) flush 5-10 mL  sodium chloride (NS) flush 5-10 mL  acetaminophen (TYLENOL) tablet 650 mg  
 ondansetron (ZOFRAN) injection 4 mg  bisacodyl (DULCOLAX) tablet 5 mg  heparin (porcine) injection 5,000 Units  nitroglycerin (NITROBID) 2 % ointment 1 Inch  cloNIDine (CATAPRES) 0.2 mg/24 hr patch 1 Patch Data Review : 
Recent Labs 11/16/18 
0100 11/15/18 
0342 11/14/18 
0304 WBC 5.4 5.0 5.8 HGB 9.0* 8.4* 9.2*  
 175 175 ANEU  --   --  3.1  143 142  
K 4.3 5.1 4.3 GLU 94 72 71 BUN 54* 57* 55* CREA 7.28* 7.37* 6.69* ALT  --   --  10* SGOT  --   --  9*  
TBILI  --   --  0.3 AP  --   --  265* CA 7.3* 7.2* 7.6*  
MG  --   --  2.0 PHOS 4.3 5.0* 4.8* Lab Results Component Value Date/Time Culture result: NO SIGNIFICANT GROWTH 11/13/2018 04:04 AM  
 
No results found for: GREGORY Recent Labs 11/14/18 
0304 TIBC 226* PSAT 23 No results found for: Aura Rodriguez MD 
[facility] 
 [email or web address] [place] [address] [employer] [address] [place], [address] Phone - [phone] Fax - [phone]

## 2018-11-16 NOTE — PROGRESS NOTES
Palliative Medicine Social Work Chart reviewed. [facility] Brice's note reviewed. D/C plan in process though no facility has committed to taking patient and awaiting Medicaid bed. Thank you for the opportunity to be involved in the care of Mrs. Trenton Wynne. [facility] Palliative Medicine  [phone]

## 2018-11-16 NOTE — ROUTINE PROCESS
Oncology Nursing Communication Tool 7:36 AM 
11/16/2018 Bedside shift change report given to [address] [place] [address] Avenue, RN (incoming nurse) by Anusha Quinonez RN (outgoing nurse) on [facility]. Report included the following information SBAR, Kardex, Intake/Output, MAR and Recent Results. Shift Summary: pt with labile affect at times & then forgets about outbursts. Bed alarm placed on bed as pt reminded frequently not to get up w/o assist due to blindness. Med x1 overnight for c/o of nausea. Issues for physician to address: none Oncology Shift Note Admission Date 11/12/2018 Admission Diagnosis DYLAN (acute kidney injury) ([facility] [address] Code Status Full Code Consults IP CONSULT TO PALLIATIVE CARE - PROVIDER 
IP CONSULT TO NEPHROLOGY 
IP CONSULT TO PALLIATIVE CARE - PROVIDER Cardiac Monitoring [] Yes [x] No  
  
Purposeful Hourly Rounding [x] Yes   
Richmond Score Total Score: 4 Richmond score 3 or > [x] Bed Alarm [] Avasys [] 1:1 sitter [] Patient refused (Place signed refusal form in chart) Pain Managed [x] Yes [] No  
 Key Pain Meds The patient is on no pain meds. Influenza Vaccine Received Flu Vaccine for Current Season (usually Sept-March): No  
 Patient/Guardian Refused (Notify MD): Yes Oxygen needs? [x] Room air Oxygen @  []1L    []2L    []3L   []4L    []5L   []6L Use home O2? [] Yes [x] No 
Perform O2 challenge test using  smartphrase (.oxygenchallenge) Last bowel movement Last Bowel Movement Date: 11/14/18 
bowel movement Urinary Catheter LDAs Peripheral IV 11/16/18 Anterior; Lower; Left Forearm (Active) Site Assessment Clean, dry, & intact 11/16/2018  1:03 AM  
Phlebitis Assessment 0 11/16/2018  1:03 AM  
Infiltration Assessment 0 11/16/2018  1:03 AM  
Dressing Status Clean, dry, & intact; New 11/16/2018  1:03 AM  
Dressing Type Tape;Transparent 11/16/2018  1:03 AM  
 Hub Color/Line Status Blue;Capped;Flushed 11/16/2018  1:03 AM  
Action Taken Blood drawn 11/16/2018  1:03 AM  
                  
  
Readmission Risk Assessment Tool Score High Risk   
      
 23 Total Score   
  
 4 Pt. Coverage (Medicare=5 , Medicaid, or Self-Pay=4) 19 Charlson Comorbidity Score (Age + Comorbid Conditions) Criteria that do not apply:  
 Has Seen PCP in Last 6 Months (Yes=3, No=0) . Living with Significant Other. Assisted Living. LTAC. SNF. or  
Rehab Patient Length of Stay (>5 days = 3) IP Visits Last 12 Months (1-3=4, 4=9, >4=11) Expected Length of Stay 2d 7h Actual Length of Stay 4 Salbador Phoenix RN

## 2018-11-17 NOTE — PROGRESS NOTES
Hospitalist Progress Note NAME: Miguel A Garcia :  1955 MRN:  [id number] Assessment / Plan: 
DYLAN in setting of CKD5 with dehydration (nausea and L sided abdominal pain x2 weeks) - DYLAN improving, abdominal pain resolved: has never been on HD before.  Previously saw Dr. Miguel Nguyễn and has a nonworking fisula in her RUE. - IV fluids - check lipase with next blood draw 
- consider CT A/P in AM pending clinical course 
- nephrology consulted; general sugery consulted regarding fistula : 
Cr improved marginally. Appreciate Nephrology eval. Lipase wnl. Abdo pain resolved - will not therefore pursue CT A/P. Encourage PO intake. : 
Pt tolerating regular PO. DYLAN marginally improved. Palliative Care consult noted - family meeting for today. Appreciate Nephrology eval.  
 
11/15: 
[address] Nephrology eval - pt not a candidate for dialysis. Choice of SNF not possible. Pt's chart has been referred to other facilities. Dispo pending.  
 
: 
Pending SNF placement.  
  
Hypertension, benign/essential:  Stopped all her meds some time ago 
- clonidine patch ordered to try and improve compliance - prn nitrobid 
  
: Will continue Clonidine patch for now  
  
11/15: 
Norvasc added to regimen 
  
Hypoglycemia:  Likely nutritional 
- regular diet ordered 
- monitor glucose : 
AM glucose 167 - will continue monitoring. Encourage PO intake. 
  
18.5 - 24.9 Normal weight / Body mass index is 23.46 kg/m². 
  
Code status: Full Prophylaxis: Hep SQ Recommended Disposition: SNF Subjective: Chief Complaint / Reason for Physician Visit Feels well. Discussed with RN events overnight. Review of Systems: 
Symptom Y/N Comments  Symptom Y/N Comments Fever/Chills n   Chest Pain n   
Poor Appetite    Edema Cough n   Abdominal Pain n   
Sputum    Joint Pain SOB/NOGUERA n   Pruritis/Rash Nausea/vomit    Tolerating PT/OT Diarrhea    Tolerating Diet y Constipation    Other Could NOT obtain due to:   
 
Objective: VITALS:  
Last 24hrs VS reviewed since prior progress note. Most recent are: 
Patient Vitals for the past 24 hrs: 
 Temp Pulse Resp BP SpO2  
11/17/18 0800 98.5 °F (36.9 °C) 84 20 159/65 95 % 11/16/18 2322 98.5 °F (36.9 °C) 86 18 153/73 98 % 11/16/18 1942 98.9 °F (37.2 °C) 81 18 143/65 100 % 11/16/18 1524 98.8 °F (37.1 °C) 76 16 144/60 98 % Intake/Output Summary (Last 24 hours) at 11/17/2018 1411 Last data filed at 11/17/2018 1001 Gross per 24 hour Intake 600 ml Output 500 ml Net 100 ml PHYSICAL EXAM: 
General:          Alert, cooperative, no acute distress   
EENT:              +blind Resp:               CTA bilaterally, no wheezing or rales.  No accessory muscle use NC:                  [id number]  rhythm,  No edema GI:                   Soft, Non distended, Non tender.  +Bowel sounds Neurologic:      Alert and oriented X 3, normal speech, Psych:             Fair insight. Not anxious nor agitated Skin:                No rashes.  No jaundice Reviewed most current lab test results and cultures  YES Reviewed most current radiology test results   YES Review and summation of old records today    NO Reviewed patient's current orders and MAR    YES 
PMH/ reviewed - no change compared to H&P 
________________________________________________________________________ Care Plan discussed with: 
  Comments Patient x Family RN Care Manager Consultant Multidiciplinary team rounds were held today with , nursing, pharmacist and clinical coordinator. Patient's plan of care was discussed; medications were reviewed and discharge planning was addressed. ________________________________________________________________________ Total NON critical care TIME:  15   Minutes Total CRITICAL CARE TIME Spent:   Minutes non procedure based Comments >50% of visit spent in counseling and coordination of care    
________________________________________________________________________ Nisha Harrington MD  
 
Procedures: see electronic medical records for all procedures/Xrays and details which were not copied into this note but were reviewed prior to creation of Plan. LABS: 
I reviewed today's most current labs and imaging studies. Pertinent labs include: 
Recent Labs 11/16/18 
0100 11/15/18 
0342 WBC 5.4 5.0 HGB 9.0* 8.4* HCT 29.5* 28.1*  
 175 Recent Labs 11/17/18 
0155 11/16/18 
0100 11/15/18 
0342  141 143  
K 4.4 4.3 5.1 * 111* 112* CO2 21 23 22 GLU 82 94 72 BUN 60* 54* 57* CREA 7.54* 7.28* 7.37* CA 8.0* 7.3* 7.2*  
PHOS 4.4 4.3 5.0* ALB 3.1* 2.9* 2.7* Signed: Nisha Harrington MD

## 2018-11-17 NOTE — PROGRESS NOTES
Oncology Nursing Communication Tool 5:41 AM 
11/17/2018 Bedside shift change report given to Maria Leahy (incoming nurse) by Pennie Winters (outgoing nurse) on [facility]. Report included the following information SBAR, Kardex, Intake/Output, MAR and Recent Results. Shift Summary: no changes overnight. Up to RR with assistance. Awaiting d/c Issues for physician to address: none Oncology Shift Note Admission Date 11/12/2018 Admission Diagnosis DYLAN (acute kidney injury) ([facility] [address] Code Status Full Code Consults IP CONSULT TO PALLIATIVE CARE - PROVIDER 
IP CONSULT TO NEPHROLOGY 
IP CONSULT TO PALLIATIVE CARE - PROVIDER Cardiac Monitoring [] Yes [x] No  
  
Purposeful Hourly Rounding [x] Yes   
Richmond Score Total Score: 4 Richmond score 3 or > [x] Bed Alarm [] Avasys [] 1:1 sitter [] Patient refused (Place signed refusal form in chart) Pain Managed [] Yes [] No  
 Key Pain Meds The patient is on no pain meds. Influenza Vaccine Received Flu Vaccine for Current Season (usually Sept-March): No  
 Patient/Guardian Refused (Notify MD): Yes Oxygen needs? [x] Room air Oxygen @  []1L    []2L    []3L   []4L    []5L   []6L Use home O2? [] Yes [] No 
Perform O2 challenge test using  smartphrase (.oxygenchallenge) Last bowel movement Last Bowel Movement Date: 11/14/18 
bowel movement Urinary Catheter LDAs Peripheral IV 11/16/18 Anterior; Lower; Left Forearm (Active) Site Assessment Clean, dry, & intact 11/17/2018  3:54 AM  
Phlebitis Assessment 0 11/17/2018  3:54 AM  
Infiltration Assessment 0 11/17/2018  3:54 AM  
Dressing Status Clean, dry, & intact 11/17/2018  3:54 AM  
Dressing Type Tape;Transparent 11/17/2018  3:54 AM  
Hub Color/Line Status Blue;Flushed;Patent 11/17/2018  3:54 AM  
Action Taken Blood drawn 11/16/2018  1:03 AM  
                  
  
Readmission Risk Assessment Tool Score High Risk 23      
Total Score   
  
 4 Pt. Coverage (Medicare=5 , Medicaid, or Self-Pay=4) 19 Charlson Comorbidity Score (Age + Comorbid Conditions) Criteria that do not apply:  
 Has Seen PCP in Last 6 Months (Yes=3, No=0) . Living with Significant Other. Assisted Living. LTAC. SNF. or  
Rehab Patient Length of Stay (>5 days = 3) IP Visits Last 12 Months (1-3=4, 4=9, >4=11) Expected Length of Stay 2d 7h Actual Length of Stay 5 Catherine Wagoner

## 2018-11-18 NOTE — PROGRESS NOTES
Attempted to obtain am labs from pt. Unsuccessful on first attempt. Pt became verbally aggressive and refuses to allow another attempt. Will make day shift nurses aware so ordering physician can be made aware of circumstances.

## 2018-11-18 NOTE — PROGRESS NOTES
Pt complains of nausea and vomited a small amount. Pt refuses IV Zofran. Pt does not want IV medications.

## 2018-11-18 NOTE — PROGRESS NOTES
Pt refusing assessment. Pt stated \"I am going to call my . This is ridiculous. I need to get out of here and go to a facility that knows me. \" Nurse told patient that case management will be called for her.

## 2018-11-18 NOTE — PROGRESS NOTES
Oncology Nursing Communication Tool 
7:00 PM 
11/18/2018 \"Bedside\" shift change report given to NARDA Jacobson (incoming nurse) by Sunshine Campos (outgoing nurse) on [facility]. Report included the following information Shift Summary: Pt refusing assessments and meds, pt refused BP x1, pt thinks RN from last night \"put some kind of medication when he said he was just flushing the IV and it made me vomit and have chest pain. \" Pt attempted to go to the bathroom without assistance, pt stating she wants to leave as well as call her , BM x1 Issues for physician to address: discharge Oncology Shift Note Admission Date 11/12/2018 Admission Diagnosis DYLAN (acute kidney injury) ([facility] [address] Code Status Full Code Consults IP CONSULT TO PALLIATIVE CARE - PROVIDER 
IP CONSULT TO NEPHROLOGY 
IP CONSULT TO PALLIATIVE CARE - PROVIDER Cardiac Monitoring [] Yes [x] No  
  
Purposeful Hourly Rounding [x] Yes   
Richmond Score Total Score: 5 Richmond score 3 or > [] Bed Alarm [] Avasys [] 1:1 sitter [] Patient refused (Place signed refusal form in chart) Pain Managed [x] Yes [] No  
 Key Pain Meds The patient is on no pain meds. Influenza Vaccine Received Flu Vaccine for Current Season (usually Sept-March): No  
 Patient/Guardian Refused (Notify MD): Yes Oxygen needs? [x] Room air Oxygen @  []1L    []2L    []3L   []4L    []5L   []6L Use home O2? [] Yes [] No 
Perform O2 challenge test using  smartphrase (.oxygenchallenge) Last bowel movement bowel movement 11/18/18 Urinary Catheter LDAs Peripheral IV 11/16/18 Anterior; Lower; Left Forearm (Active) Site Assessment Clean, dry, & intact 11/18/2018  3:20 PM  
Phlebitis Assessment 0 11/18/2018  3:20 PM  
Infiltration Assessment 0 11/18/2018  3:20 PM  
Dressing Status Clean, dry, & intact 11/18/2018  3:20 PM  
Dressing Type Tape;Transparent 11/18/2018  3:20 PM  
 Hub Color/Line Status Blue 11/18/2018  3:20 PM  
Action Taken Blood drawn 11/16/2018  1:03 AM  
                  
  
Readmission Risk Assessment Tool Score High Risk   
      
 26 Total Score 3 Patient Length of Stay (>5 days = 3)  
 4 Pt. Coverage (Medicare=5 , Medicaid, or Self-Pay=4) 19 Charlson Comorbidity Score (Age + Comorbid Conditions) Criteria that do not apply:  
 Has Seen PCP in Last 6 Months (Yes=3, No=0) . Living with Significant Other. Assisted Living. LTAC. SNF. or  
Rehab  
 IP Visits Last 12 Months (1-3=4, 4=9, >4=11) Expected Length of Stay 2d 7h Actual Length of Stay 6 Saumya Cage

## 2018-11-18 NOTE — PROGRESS NOTES
Oncology Nursing Communication Tool 1:31 AM 
11/18/2018 Bedside shift change report given to Johan Hwang RN (incoming nurse) by Ramirez Drummond (outgoing nurse) on [facility]. Report included the following information SBAR, Kardex, Intake/Output and MAR. Shift Summary: Pt remained stable. Pt complains of N/V but refused IV Zofran. Pt became verbally aggressive after unsuccessful attempt at am lab draws. Pt refuses to let nurse attempt to draw labs again. Issues for physician to address: Oncology Shift Note Admission Date 11/12/2018 Admission Diagnosis DYLAN (acute kidney injury) ([facility] [address] Code Status Full Code Consults IP CONSULT TO PALLIATIVE CARE - PROVIDER 
IP CONSULT TO NEPHROLOGY 
IP CONSULT TO PALLIATIVE CARE - PROVIDER Cardiac Monitoring [] Yes [x] No  
  
Purposeful Hourly Rounding [x] Yes   
Richmond Score Total Score: 4 Richmond score 3 or > [] Bed Alarm [] Avasys [] 1:1 sitter [] Patient refused (Place signed refusal form in chart) Pain Managed [x] Yes [] No  
 Key Pain Meds The patient is on no pain meds. Influenza Vaccine Received Flu Vaccine for Current Season (usually Sept-March): No  
 Patient/Guardian Refused (Notify MD): Yes Oxygen needs? [x] Room air Oxygen @  []1L    []2L    []3L   []4L    []5L   []6L Use home O2? [] Yes [x] No 
Perform O2 challenge test using  smartphrase (.oxygenchallenge) Last bowel movement Last Bowel Movement Date: 11/14/18 
bowel movement Urinary Catheter LDAs Peripheral IV 11/16/18 Anterior; Lower; Left Forearm (Active) Site Assessment Clean 11/17/2018  4:32 PM  
Phlebitis Assessment 0 11/17/2018  4:32 PM  
Infiltration Assessment 0 11/17/2018  4:32 PM  
Dressing Status Clean, dry, & intact 11/17/2018  4:32 PM  
Dressing Type Transparent 11/17/2018  4:32 PM  
Hub Color/Line Status Blue;Capped 11/17/2018  8:45 AM  
Action Taken Blood drawn 11/16/2018  1:03 AM  
 Readmission Risk Assessment Tool Score High Risk   
      
 26 Total Score 3 Patient Length of Stay (>5 days = 3)  
 4 Pt. Coverage (Medicare=5 , Medicaid, or Self-Pay=4) 19 Charlson Comorbidity Score (Age + Comorbid Conditions) Criteria that do not apply:  
 Has Seen PCP in Last 6 Months (Yes=3, No=0) . Living with Significant Other. Assisted Living. LTAC. SNF. or  
Rehab  
 IP Visits Last 12 Months (1-3=4, 4=9, >4=11) Expected Length of Stay 2d 7h Actual Length of Stay 6 Isha Fischer

## 2018-11-18 NOTE — PROGRESS NOTES
NICKOLAS spoke with Gagandeep Acosta, admissions director at [address] (phone: [phone]), and she stated that she did a bedside visit with the patient on  11/15/18. She stated that they are willing to accept the patient but at this time the facility does not have any LTC beds. She stated that she will contact CM tomorrow to discuss LTC bed availability. CM will continue to follow the patient for dispo planning. Adina Thomas LCSW

## 2018-11-18 NOTE — PROGRESS NOTES
Problem: Falls - Risk of 
Goal: *Absence of Falls Document Afshan Guerra Fall Risk and appropriate interventions in the flowsheet. Outcome: Progressing Towards Goal 
Fall Risk Interventions: 
Mobility Interventions: Assess mobility with egress test, Bed/chair exit alarm Medication Interventions: Assess postural VS orthostatic hypotension, Bed/chair exit alarm Elimination Interventions: Bed/chair exit alarm, Call light in reach History of Falls Interventions: Bed/chair exit alarm Problem: Patient Education: Go to Patient Education Activity Goal: Patient/Family Education Outcome: Progressing Towards Goal 
Pt understands to call for help before getting out of bed.

## 2018-11-18 NOTE — PROGRESS NOTES
Hospitalist Progress Note NAME: Sorin Shahid :  1955 MRN:  [id number] Assessment / Plan: 
DYLAN in setting of CKD5 with dehydration (nausea and L sided abdominal pain x2 weeks) - DYLAN improving, abdominal pain resolved: has never been on HD before.  Previously saw Dr. 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 - IV fluids - check lipase with next blood draw 
- consider CT A/P in AM pending clinical course 
- nephrology consulted; general sugery consulted regarding fistula : 
Cr improved marginally. Appreciate Nephrology eval. Lipase wnl. Abdo pain resolved - will not therefore pursue CT A/P. Encourage PO intake. : 
Pt tolerating regular PO. DYLAN marginally improved. Palliative Care consult noted - family meeting for today. Appreciate Nephrology eval.  
 
11/15: 
[address] Nephrology eval - pt not a candidate for dialysis. Choice of SNF not possible. Pt's chart has been referred to other facilities. Dispo pending.  
 
: Accepted to [facility] SNF - but no bed available today. 
  
Hypertension, benign/essential:  Stopped all her meds some time ago 
- clonidine patch ordered to try and improve compliance - prn nitrobid 
  
: Will continue Clonidine patch for now  
  
11/15: 
Norvasc added to regimen 
  
Hypoglycemia:  Likely nutritional 
- regular diet ordered 
- monitor glucose : 
AM glucose 167 - will continue monitoring. Encourage PO intake. 
  
18.5 - 24.9 Normal weight / Body mass index is 23.46 kg/m². 
  
Code status: Full Prophylaxis: Hep SQ Recommended Disposition: SNF Subjective: Chief Complaint / Reason for Physician Visit Feels well. Discussed with RN events overnight. Review of Systems: 
Symptom Y/N Comments  Symptom Y/N Comments Fever/Chills n   Chest Pain n   
Poor Appetite    Edema Cough n   Abdominal Pain n   
Sputum    Joint Pain SOB/NOGUERA n   Pruritis/Rash Nausea/vomit    Tolerating PT/OT Diarrhea    Tolerating Diet y Constipation    Other Could NOT obtain due to:   
 
Objective: VITALS:  
Last 24hrs VS reviewed since prior progress note. Most recent are: 
Patient Vitals for the past 24 hrs: 
 Temp Pulse Resp BP SpO2  
11/18/18 0732 99.2 °F (37.3 °C) 82 16  100 % 11/17/18 2311 99.3 °F (37.4 °C) 72 18 167/70 98 % 11/17/18 1911 99.4 °F (37.4 °C) 83 16 165/75 100 % 11/17/18 1500 99.1 °F (37.3 °C) 90 20 158/65 100 % Intake/Output Summary (Last 24 hours) at 11/18/2018 1129 Last data filed at 11/17/2018 2045 Gross per 24 hour Intake 240 ml Output 500 ml Net -260 ml PHYSICAL EXAM: 
General:          Alert, cooperative, no acute distress   
EENT:              +blind Resp:               CTA bilaterally, no wheezing or rales.  No accessory muscle use KD:                  SAJHFAP  rhythm,  No edema GI:                   Soft, Non distended, Non tender.  +Bowel sounds Neurologic:      Alert and oriented X 3, normal speech, Psych:             Fair insight. Not anxious nor agitated Skin:                No rashes.  No jaundice Reviewed most current lab test results and cultures  YES Reviewed most current radiology test results   YES Review and summation of old records today    NO Reviewed patient's current orders and MAR    YES 
PMH/ reviewed - no change compared to H&P 
________________________________________________________________________ Care Plan discussed with: 
  Comments Patient x Family RN x Care Manager Consultant Multidiciplinary team rounds were held today with , nursing, pharmacist and clinical coordinator. Patient's plan of care was discussed; medications were reviewed and discharge planning was addressed. ________________________________________________________________________ Total NON critical care TIME:  25   Minutes Total CRITICAL CARE TIME Spent:   Minutes non procedure based Comments >50% of visit spent in counseling and coordination of care    
________________________________________________________________________ Dionne Lund MD  
 
Procedures: see electronic medical records for all procedures/Xrays and details which were not copied into this note but were reviewed prior to creation of Plan. LABS: 
I reviewed today's most current labs and imaging studies. Pertinent labs include: 
Recent Labs 11/16/18 
0100 WBC 5.4 HGB 9.0*  
HCT 29.5*  
 Recent Labs 11/18/18 
0711 11/17/18 
0155 11/16/18 
0100  142 141  
K 4.5 4.4 4.3 * 112* 111* CO2 19* 21 23 GLU 82 82 94 BUN 57* 60* 54* CREA 7.53* 7.54* 7.28* CA 8.2* 8.0* 7.3*  
PHOS 4.6 4.4 4.3 ALB 3.1* 3.1* 2.9* Signed: Dionne Lund MD

## 2018-11-19 NOTE — PROGRESS NOTES
Problem: Self Care Deficits Care Plan (Adult) Goal: *Acute Goals and Plan of Care (Insert Text) Occupational Therapy Goals Initiated 11/13/2018 1. Patient will perform grooming in standing VSS with supervision/set-up within 7 day(s). 2.  Patient will perform bathing with supervision/set-up within 7 day(s). 3.  Patient will perform upper body dressing and lower body dressing with supervision/set-up within 7 day(s). 4.  Patient will perform toilet transfers with supervision/set-up within 7 day(s). 5.  Patient will perform all aspects of toileting with supervision/set-up within 7 day(s). 6.  Patient will participate in upper extremity therapeutic exercise/activities with supervision/set-up for 5 minutes within 7 day(s). 7.  Patient will utilize energy conservation, fall prevention techniques with visual impairments during functional activities with verbal cues within 7 day(s). Occupational Therapy TREATMENT Patient: Elton Washington (12 y.o. female) Date: 11/19/2018 Diagnosis: DYLAN (acute kidney injury) ([facility] [address] <principal problem not specified> Precautions: Fall(legally blind) Chart, occupational therapy assessment, plan of care, and goals were reviewed. ASSESSMENT: 
Patient is making progress in self-care. She continues to require overall CG to S and verbal/tactile cues for safety and proper technique. She does not use an assistive device, but she is constantly reaching for objects in the environment to steady herself. Patient will need 24 hour supervision/assistance at home for safety. It does not seem like she has family that will be able to provide that level of care. She will need to go to a skilled nursing facility for rehab to increase safety/independence.  
Progression toward goals: 
[x]       Improving appropriately and progressing toward goals 
[]       Improving slowly and progressing toward goals 
[]       Not making progress toward goals and plan of care will be adjusted PLAN: 
Patient continues to benefit from skilled intervention to address the above impairments. Continue treatment per established plan of care. Discharge Recommendations:  [address] [place] Further Equipment Recommendations for Discharge: To be determined SUBJECTIVE:  
Patient stated Wellington Bernheim you so much.  OBJECTIVE DATA SUMMARY:  
Cognitive/Behavioral Status: 
Neurologic State: Alert;Confused Orientation Level: Disoriented to time Cognition: Follows commands Perseveration: No perseveration noted Safety/Judgement: Awareness of environment;Decreased awareness of need for safety;Decreased insight into deficits Functional Mobility and Transfers for ADLs:Bed Mobility: 
Rolling: Modified independent Supine to Sit: Modified independent Sit to Supine: Modified independent Scooting: Modified independent Transfers: 
Sit to Stand: Supervision Functional Transfers Bathroom Mobility: Contact guard assistance(mostly due to vision) Toilet Transfer : Supervision Cues: Verbal cues provided Balance: 
Sitting: Intact Standing: Impaired Standing - Static: Good Standing - Dynamic : Fair(due to vision) ADL Intervention: 
  
 
Grooming Washing Face: Supervision/set-up(standing at the sink) Washing Hands: Supervision/set-up(standing at the sink) Brushing Teeth: Supervision/set-up(in bed with head of bed elevated) Cues: Verbal cues provided; Tactile cues provided Lower Body Dressing Assistance Socks: Supervision/set-up Leg Crossed Method Used: No 
Position Performed: Seated edge of bed Cues: Verbal cues provided Toileting Toileting Assistance: Stand-by assistance Bladder Hygiene: Supervision/set-up Clothing Management: Contact guard assistance Cues: Verbal cues provided Adaptive Equipment: Grab bars Cognitive Retraining Problem Solving: Deductive reason; Identifying the problem Organizing/Sequencing: Breaking task down Safety/Judgement: Awareness of environment;Decreased awareness of need for safety;Decreased insight into deficits Pain: 
Pain Scale 1: Numeric (0 - 10) Pain Intensity 1: 0 Activity Tolerance:  
Fair After treatment:  
[] Patient left in no apparent distress sitting up in chair 
[x] Patient left in no apparent distress in bed 
[x] Call bell left within reach [x] Nursing notified 
[] Caregiver present 
[] Bed alarm activated COMMUNICATION/COLLABORATION:  
The patients plan of care was discussed with: Physical Therapist and Registered Nurse RUI Tinsley/L Time Calculation: 23 mins

## 2018-11-19 NOTE — PROGRESS NOTES
Oncology Nursing Communication Tool 
7:24 AM 
11/19/2018 Bedside shift change report given to Smith Moya RN (incoming nurse) by Patrick Phillip RN (outgoing nurse) on [facility]. Report included the following information SBAR, Kardex, MAR and Accordion. Shift Summary: Pt more pleasant but still refusing iv to be flushed, not calling out when she needs to go to the bathroom Issues for physician to address: Oncology Shift Note Admission Date 11/12/2018 Admission Diagnosis DYLAN (acute kidney injury) ([facility] [address] Code Status Full Code Consults IP CONSULT TO PALLIATIVE CARE - PROVIDER 
IP CONSULT TO NEPHROLOGY 
IP CONSULT TO PALLIATIVE CARE - PROVIDER Cardiac Monitoring [] Yes [x] No  
  
Purposeful Hourly Rounding [x] Yes   
Richmond Score Total Score: 5 Richmond score 3 or > [x] Bed Alarm [] Avasys [] 1:1 sitter [] Patient refused (Place signed refusal form in chart) Pain Managed [x] Yes [] No  
 Key Pain Meds The patient is on no pain meds. Influenza Vaccine Received Flu Vaccine for Current Season (usually Sept-March): No  
 Patient/Guardian Refused (Notify MD): Yes Oxygen needs? [x] Room air Oxygen @  []1L    []2L    []3L   []4L    []5L   []6L Use home O2? [] Yes [] No 
Perform O2 challenge test using  smartphrase (.oxygenchallenge) Last bowel movement Last Bowel Movement Date: 11/18/18 
bowel movement Urinary Catheter LDAs Peripheral IV 11/16/18 Anterior; Lower; Left Forearm (Active) Site Assessment Clean, dry, & intact 11/19/2018  3:04 AM  
Phlebitis Assessment 0 11/19/2018  3:04 AM  
Infiltration Assessment 0 11/19/2018  3:04 AM  
Dressing Status Clean, dry, & intact 11/19/2018  3:04 AM  
Dressing Type Transparent;Tape 11/19/2018  3:04 AM  
Hub Color/Line Status Blue;Capped 11/19/2018  3:04 AM  
Action Taken Other (comment) 11/18/2018  9:05 PM  
                  
  
 Readmission Risk Assessment Tool Score High Risk   
      
 26 Total Score 3 Patient Length of Stay (>5 days = 3)  
 4 Pt. Coverage (Medicare=5 , Medicaid, or Self-Pay=4) 19 Charlson Comorbidity Score (Age + Comorbid Conditions) Criteria that do not apply:  
 Has Seen PCP in Last 6 Months (Yes=3, No=0) . Living with Significant Other. Assisted Living. LTAC. SNF. or  
Rehab  
 IP Visits Last 12 Months (1-3=4, 4=9, >4=11) Expected Length of Stay 2d 7h Actual Length of Stay 7 Dick Kapadia, RN

## 2018-11-19 NOTE — PROGRESS NOTES
CM spoke to [facility] and Rehab this morning. They are reviewing patient information and confirming Medicaid. [facility] to call CM with update on ability to accept patient. Mass search for LTC bed has been sent via [employer] and [employer]. Shakir Canela RN, BSN, AC  - Medical Oncology [phone]

## 2018-11-19 NOTE — PROGRESS NOTES
Hospitalist Progress Note NAME: Rachel Marino :  1955 MRN:  [id number] Assessment / Plan: 
DYLAN in setting of CKD5 with dehydration (nausea and L sided abdominal pain x2 weeks) - DYLAN improving, abdominal pain resolved: has never been on HD before.  Previously saw Dr. Howard Rivera and has a nonworking fisula in her RUE. - IV fluids - check lipase with next blood draw 
- consider CT A/P in AM pending clinical course 
- nephrology consulted; general sugery consulted regarding fistula 11/15: 
Appreciate Nephrology eval - pt not a candidate for dialysis. Choice of SNF not possible. Pt's chart has been referred to other facilities. Dispo pending.  
 
: Accepted to [facility] SNF - but no bed available today. : 
Pt seemingly accepted to [facility] - will know with certainty tomorrow. Pt is not a candidate for dialysis and she has refused to be on dialysis as well. She continues to want to be Full Code.  
  
Hypertension, benign/essential:  Stopped all her meds some time ago 
- clonidine patch ordered to try and improve compliance - prn nitrobid 
  
: Will continue Clonidine patch for now  
  
11/15: 
Norvasc added to regimen 
  
Hypoglycemia - resolved 18.5 - 24.9 Normal weight / Body mass index is 23.46 kg/m². 
  
Code status: Full Prophylaxis: Hep SQ Recommended Disposition: Likely accepted to [address] [place] [address] [address] Subjective: Chief Complaint / Reason for Physician Visit Feels well. Discussed with RN events overnight. Review of Systems: 
Symptom Y/N Comments  Symptom Y/N Comments Fever/Chills n   Chest Pain n   
Poor Appetite    Edema Cough n   Abdominal Pain n   
Sputum    Joint Pain SOB/NOGUERA n   Pruritis/Rash Nausea/vomit    Tolerating PT/OT Diarrhea    Tolerating Diet y Constipation    Other Could NOT obtain due to:   
 
Objective: VITALS:  
Last 24hrs VS reviewed since prior progress note. Most recent are: Patient Vitals for the past 24 hrs: 
 Temp Pulse Resp BP SpO2  
11/19/18 1545 98.7 °F (37.1 °C) 80 16 151/72 98 % 11/19/18 0745 98.6 °F (37 °C) 73 16 176/79 100 % 11/18/18 2310 98.9 °F (37.2 °C) 74 16 166/74 97 % 11/18/18 1950 99 °F (37.2 °C) 92 16 149/70 96 % Intake/Output Summary (Last 24 hours) at 11/19/2018 1559 Last data filed at 11/19/2018 4179 Gross per 24 hour Intake  Output 900 ml Net -900 ml PHYSICAL EXAM: 
General: Alert, cooperative, no acute distress   
EENT:  +Blind Resp:  CTA bilaterally, no wheezing or rales. No accessory muscle use CV:  Regular  rhythm,  No edema GI:  Soft, Non distended, Non tender.  +Bowel sounds Neurologic:  Alert and oriented X 3, normal speech, Psych:   Fair insight.  
Skin:  No rashes. No jaundice Reviewed most current lab test results and cultures  YES Reviewed most current radiology test results   YES Review and summation of old records today    NO Reviewed patient's current orders and MAR    YES 
PMH/ reviewed - no change compared to H&P 
________________________________________________________________________ Care Plan discussed with: 
  Comments Patient x Family RN x Care Manager x Consultant Multidiciplinary team rounds were held today with , nursing, pharmacist and clinical coordinator. Patient's plan of care was discussed; medications were reviewed and discharge planning was addressed. ________________________________________________________________________ Total NON critical care TIME:  25   Minutes Total CRITICAL CARE TIME Spent:   Minutes non procedure based Comments >50% of visit spent in counseling and coordination of care    
________________________________________________________________________ Michelle Valdivia MD  
 
Procedures: see electronic medical records for all procedures/Xrays and details which were not copied into this note but were reviewed prior to creation of Plan. LABS: 
I reviewed today's most current labs and imaging studies. Pertinent labs include: 
Recent Labs 11/19/18 
4466 WBC 7.8 HGB 10.3* HCT 34.1*  
 Recent Labs 11/18/18 
3417 11/17/18 
0155  142  
K 4.5 4.4  
* 112* CO2 19* 21  
GLU 82 82 BUN 57* 60* CREA 7.53* 7.54* CA 8.2* 8.0*  
PHOS 4.6 4.4 ALB 3.1* 3.1* Signed: Ryan Robbins MD

## 2018-11-19 NOTE — PROGRESS NOTES
Bedside shift change report given to Marsha Mo RN (oncoming nurse) by Kitty Duggan RN (offgoing nurse). Report included the following information SBAR, Kardex and MAR.

## 2018-11-19 NOTE — PROGRESS NOTES
Problem: Mobility Impaired (Adult and Pediatric) Goal: *Acute Goals and Plan of Care (Insert Text) Physical Therapy Goals Initiated 11/13/2018 1. Patient will move from supine to sit and sit to supine  in bed with modified independence within 7 day(s). 2.  Patient will transfer from bed to chair and chair to bed with supervision/set-up using the least restrictive device within 7 day(s). 3.  Patient will perform sit to stand with supervision/set-up within 7 day(s). 4.  Patient will ambulate with minimal assistance/contact guard assist for 60 feet with the least restrictive device within 7 day(s). 5.  Patient will ascend/descend 3 stairs with no handrail(s) with minimal assistance/contact guard assist within 7 day(s). physical Therapy TREATMENT Seen 1303 to 1326 Patient: Miguel A Garcia (1 y.o. female) Date: 11/19/2018 Diagnosis: DYLAN (acute kidney injury) ([facility] [address] <principal problem not specified> Precautions: Fall (legally blind) Chart, physical therapy assessment, plan of care and goals were reviewed. ASSESSMENT: 
Cleared by nurse to see. Patient was willing to work with therapies. Progression toward goals: 
[]    Improving appropriately and progressing toward goals 
[]    Improving slowly and progressing toward goals 
[]    Not making progress toward goals and plan of care will be adjusted PLAN: 
Patient continues to benefit from skilled intervention to address the above impairments. Continue treatment per established plan of care. Discharge Recommendations:  [place] Further Equipment Recommendations for Discharge:  Defer to SNF rehab SUBJECTIVE:  
Patient stated Sure, I'll try to walk a little.  OBJECTIVE DATA SUMMARY:  
Critical Behavior: 
Neurologic State: Alert, Confused Orientation Level: Disoriented to time Cognition: Follows commands Safety/Judgement: Awareness of environment, Decreased awareness of need for safety, Decreased insight into deficits Functional Mobility Training: 
Bed Mobility: 
Rolling: Modified independent Supine to Sit: Modified independent Sit to Supine: Modified independent Scooting: Independent Transfers: 
Sit to Stand: Supervision Stand to Sit: Contact guard assistance Balance: 
Sitting: Intact Standing: Impaired Standing - Static: Constant support;Good Standing - Dynamic : FairAmbulation/Gait Training: 
Distance (ft): 150 Feet (ft) Assistive Device: Gait belt(and HHA) Ambulation - Level of Assistance: Minimal assistance(HHA) Gait Abnormalities: Decreased step clearance Speed/Milena: Pace decreased (<100 feet/min) Pain: 
Pain Scale 1: Numeric (0 - 10) Pain Intensity 1: 0 Activity Tolerance: Tolerated PT treatment session well. After treatment:  
[]    Patient left in no apparent distress sitting up in chair 
[x]    Patient left in no apparent distress in bed 
[x]    Call bell left within reach [x]    Nursing notified 
[]    Caregiver present 
[]    Bed alarm activated (not being used) COMMUNICATION/COLLABORATION:  
The patients plan of care was discussed with: Registered Nurse Spencer Mata, PT Time Calculation: 23 mins

## 2018-11-20 NOTE — PROGRESS NOTES
Problem: Mobility Impaired (Adult and Pediatric) Goal: *Acute Goals and Plan of Care (Insert Text) Physical Therapy Goals Initiated 11/13/2018 1. Patient will move from supine to sit and sit to supine  in bed with modified independence within 7 day(s). 2.  Patient will transfer from bed to chair and chair to bed with supervision/set-up using the least restrictive device within 7 day(s). 3.  Patient will perform sit to stand with supervision/set-up within 7 day(s). 4.  Patient will ambulate with minimal assistance/contact guard assist for 60 feet with the least restrictive device within 7 day(s). 5.  Patient will ascend/descend 3 stairs with no handrail(s) with minimal assistance/contact guard assist within 7 day(s). physical Therapy TREATMENT Seen 1019 to 1034 Patient: Chidi Raya (51 y.o. female) Date: 11/20/2018 Diagnosis: DYLAN (acute kidney injury) ([facility] [address] <principal problem not specified> Precautions: Fall(legally blind) Chart, physical therapy assessment, plan of care and goals were reviewed. ASSESSMENT: 
Patient seen for mobility skills and ambulation. She was agreeable to working with PT. Modified independent with bed mobility. Good sitting balance at bedside. Stood with supervision. Ambulated 250' with HHA min x 2. Slowed pace of gait. Occasional path deviations. Needs cueing secondary to little vision. Back to bed at end of session. Nurse made aware. Progression toward goals: 
[]    Improving appropriately and progressing toward goals 
[]    Improving slowly and progressing toward goals 
[]    Not making progress toward goals and plan of care will be adjusted PLAN: 
Patient continues to benefit from skilled intervention to address the above impairments. Continue treatment per established plan of care. Discharge Recommendations:  [place] Further Equipment Recommendations for Discharge:  Defer to SNF rehab SUBJECTIVE:  
 Patient stated Oh, you worked with me yesterday.  OBJECTIVE DATA SUMMARY:  
Critical Behavior: 
Neurologic State: Alert Orientation Level: Oriented X4 Cognition: Follows commands Safety/Judgement: Awareness of environment, Decreased awareness of need for safety, Decreased insight into deficits Functional Mobility Training: 
Bed Mobility: 
Rolling: Modified independent Supine to Sit: Modified independent Sit to Supine: Modified independent Scooting: Independent Transfers: 
Sit to Stand: Supervision Stand to Sit: Contact guard assistance Balance: 
Sitting: Intact Standing: Impaired Standing - Static: Constant support;Good Standing - Dynamic : FairAmbulation/Gait Training: 
Distance (ft): 250 Feet (ft) Assistive Device: Gait belt(HHA x 2) Ambulation - Level of Assistance: Minimal assistance;Assist x2(HHA) Gait Abnormalities: Decreased step clearance Speed/Milena: Pace decreased (<100 feet/min) Step Length: Left shortened;Right shortened Pain:Stated that she had some soreness in her hips with walking. Activity Tolerance: Tolerated PT session well. After treatment:  
[]    Patient left in no apparent distress sitting up in chair 
[x]    Patient left in no apparent distress in bed 
[x]    Call bell left within reach [x]    Nursing notified 
[]    Caregiver present 
[]    Bed alarm activated COMMUNICATION/COLLABORATION:  
The patients plan of care was discussed with: Registered Nurse Malathi Garsia, PT Time Calculation: 15 mins

## 2018-11-20 NOTE — DISCHARGE INSTRUCTIONS
HOSPITALIST DISCHARGE INSTRUCTIONS    NAME: Clayton Harmon   :  1955   MRN:  [id number]     Date/Time:  2018 12:41 PM    ADMIT DATE: 2018   DISCHARGE DATE: 2018     Attending Physician: Emiliano Arthur MD    DISCHARGE DIAGNOSIS:      CKD stage IV ( not HD candidate )   HTN   Legally blind       Medications: Per above medication reconciliation. Pain Management: per above medications    Recommended diet: Renal Diet    Recommended activity: Activity as tolerated and PT/OT Eval and Treat    Wound care: None    Indwelling devices:  None    Supplemental Oxygen: None    Required Lab work: Per SNF routine    Glucose management:  None    Code status: Full        Outside physician follow up: Follow-up Information     Follow up With Specialties Details Why Contact Info    Saima Nguyen, SHEYLA Nurse Practitioner   [address]  [facility] 12      Favio Jacob MD Nephrology In 2 weeks  [facility] [address]  [facility] [id number] [phone]                 Skilled nursing facility/ SNF MD responsible for above on discharge. Information obtained by :  I understand that if any problems occur once I am at home I am to contact my physician. I understand and acknowledge receipt of the instructions indicated above.                                                                                                                                            Physician's or R.N.'s Signature                                                                  Date/Time                                                                                                                                              Patient or Repres

## 2018-11-20 NOTE — PROGRESS NOTES
Hospitalist Progress Note NAME: Clayton Harmon :  1955 MRN:  [id number] Assessment / Plan: DYLAN POA on CKD stage IV 
-likely progression of her CKD Cr today 7.53 , stable since admission, didn't improve with IVF  
-nephrology help appreciated Dr Quentin Fong: She is not a dialysis candidate. The patient's refusal of dialysis for last 1-1/2 years suggest that she would not be a good dialysis candidate.  
-seen by palliative care team.  
Per last note: 
-Patient difficult to remain on task today. Much discussion of past events, repetition about people lying, medication SE, etc.  
-All understand that dialysis is not an option, that kidneys are not working well, that life expectancy is limited but unknown. Patient presents as a bit paranoid and has firmly fixed ideas/ concerns about taking ANY new medication. (Family says this is longstanding, nothing new) - Patient completed mPOA portion of AMD, appointing her son Rafael Hartley as mPOA (no alternate) Patient states when God says it's her time, she wants to go, would NOT want to be hooked to machines or have CPR. We talked about how CPR/Shock would not change her kidney failure. Discussed importance of DDNR form, in order to honor her wishes. She is not ready to sign today, as she says she needs to pray about it first.  Acknowledge how important these conversations are, and how Alysha Cruz will know how to honor her wishes in the future, he is hearing her now say her wish to have a natural dying when that time comes. We talked about how they can get the form signed in the future if she wants it (here or at [facility])  Alysha Cruz is going to take the blank DDNR with him, so he can approach MD at [facility] if needed if she is ready to sign later. She remains FULL CODE for now Anemia of CKD 
-Hb is stable 
-on iron and epo per renal  
 
Hypertension, benign/essential 
-/150s now She stopped all her meds some time ago - clonidine patch ordered on admission to try and improve compliance, continue Norvasc added to regimen this admission - prn nitrobid Legally blind Hypernatremia due to dehydration, resolved Noncompliance Hypoglycemia, likely was nutritional, resolved Chronic bilateral hip pain Hypoalbuminemia  
  
 
 
Code status: Full Seen by palliaitve team this admission, pls see full note 11/14 Surrogate Decision Maker: kim Kraft Prophylaxis: Hep SQ ( pt was declining) Baseline: rent a room from her ;  legally blind ; Patient is , she has 5 adult children (Jairo Press, [address], Rigoberto Pupa and [address]). Recommended Disposition: [facility] today then transition to LTC Subjective: Chief Complaint / Reason for Physician Visit: following DYLAN / CKD Denies N/V/abd pain \" I am fine\" Discussed with RN events overnight. Review of Systems: 
Symptom Y/N Comments  Symptom Y/N Comments Fever/Chills    Chest Pain n   
Poor Appetite    Edema Cough    Abdominal Pain n   
Sputum    Joint Pain SOB/NOGUERA    Pruritis/Rash Nausea/vomit n   Tolerating PT/OT y Diarrhea    Tolerating Diet y Constipation n   Other Could NOT obtain due to:   
 
Objective: VITALS:  
Last 24hrs VS reviewed since prior progress note. Most recent are: 
Patient Vitals for the past 24 hrs: 
 Temp Pulse Resp BP SpO2  
11/20/18 0800 98.8 °F (37.1 °C) 76 16 148/67 98 % 11/19/18 2234 98.7 °F (37.1 °C) 79 18 142/59 100 % 11/19/18 1917 98.8 °F (37.1 °C) 83 18 155/74 97 % 11/19/18 1545 98.7 °F (37.1 °C) 80 16 151/72 98 % Intake/Output Summary (Last 24 hours) at 11/20/2018 1220 Last data filed at 11/20/2018 0930 Gross per 24 hour Intake  Output 800 ml Net -800 ml PHYSICAL EXAM: 
General: WD, WN. Alert, cooperative, no acute distress   
EENT:  EOMI. Anicteric sclerae. MMM Resp:  CTA bilaterally, no wheezing or rales. No accessory muscle use CV:  Regular  rhythm,  No edema GI:  Soft, Non distended, Non tender.  +Bowel sounds Neurologic:  Alert and oriented X 3, normal speech, Psych:   Good insight. Not anxious nor agitated Skin:  No rashes. No jaundice Reviewed most current lab test results and cultures  YES Reviewed most current radiology test results   YES Review and summation of old records today    NO Reviewed patient's current orders and MAR    YES 
PMH/SH reviewed - no change compared to H&P 
________________________________________________________________________ Care Plan discussed with: 
  Comments Patient y Family RN y   
Care Manager y Consultant Multidiciplinary team rounds were held today with , nursing, pharmacist and clinical coordinator. Patient's plan of care was discussed; medications were reviewed and discharge planning was addressed. ________________________________________________________________________ Total NON critical care TIME: 35  Minutes Total CRITICAL CARE TIME Spent:   Minutes non procedure based Comments >50% of visit spent in counseling and coordination of care y Dc coordination   
________________________________________________________________________ Jeremiah Toledo MD  
 
Procedures: see electronic medical records for all procedures/Xrays and details which were not copied into this note but were reviewed prior to creation of Plan. LABS: 
I reviewed today's most current labs and imaging studies. Pertinent labs include: 
Recent Labs 11/19/18 
8640 WBC 7.8 HGB 10.3* HCT 34.1*  
 Recent Labs 11/18/18 
4159   
K 4.5  
* CO2 19* GLU 82 BUN 57* CREA 7.53* CA 8.2* PHOS 4.6 ALB 3.1* Signed: Jeremiah Toledo MD

## 2018-11-20 NOTE — PROGRESS NOTES
[facility] has accepted pt for LTC - will be in [address] [place] [address]. Pt is aware of plan and agreeable to transition. [facility] transport is set for 3:00pm this afternoon. PCS, Facesheet, and H&P placed on chart for transport. Transport arranged via [facility] stretcher for generalized weakness from ESRD. Referral was sent via inZair. Nursing will need to call report to Alvaro Agudelo at [phone] - ask for Wing 3. Please send AVS, MAR, and any written prescriptions to facility in SNF envelope. Care Management Interventions PCP Verified by CM: Yes Mode of Transport at Discharge: BLS([facility]) Transition of Care Consult (CM Consult): LTAC([facility]) MyChart Signup: No 
Discharge Durable Medical Equipment: No 
Physical Therapy Consult: Yes Occupational Therapy Consult: Yes Speech Therapy Consult: No 
Current Support Network: [address] [place] [address] Ave, Family Lives Alvin Confirm Follow Up Transport: Other (see comment)(LTC- [facility]) Plan discussed with Pt/Family/Caregiver: Yes Discharge Location Discharge Placement: [address] [facility] [place] (LTAC)([facility] ) [facility], [facility] Care Management [phone]

## 2018-11-20 NOTE — DISCHARGE SUMMARY
Hospitalist Discharge Summary Patient ID: 
Rogelio Guillermo 
[id number] 
89 y.o. 
1955 PCP on record: Patrick Rg NP Admit date: 11/12/2018 Discharge date and time: 11/20/2018 DISCHARGE DIAGNOSIS: 
 
DYLAN POA on CKD stage IV Anemia of CKD Hypertension, benign/essential 
Legally blind Hypernatremia due to dehydration, resolved Noncompliance Hypoglycemia, likely was nutritional, resolved Chronic bilateral hip pain Hypoalbuminemia Code status: Full CONSULTATIONS: 
IP CONSULT TO PALLIATIVE CARE - PROVIDER 
IP CONSULT TO NEPHROLOGY 
IP CONSULT TO PALLIATIVE CARE - PROVIDER Excerpted HPI from H&P of Rosalina Lr MD: 
 
Kymberly Briones is a 58 y.o.  female who presents with above. Pt in her usual state of health until about 2 weeks ago. At that time, she developed nausea with vomiting. She complains of \"stomach pain\" which wraps around to the L side. She denies diarrhea at this time with last BM yesterday. She denies fevers at home but does not check her temperature. She denies SOB or CP. No recent URI sx. She denies new weakness or edema. She says that she still urinates well. She has a RUE fistula which she says she was told was not working about 2 years ago. She has previously seen Dr. Neha Wang for her renal failure, but not recently. 
  
We were asked to admit for work up and evaluation of the above problems. ______________________________________________________________________ DISCHARGE SUMMARY/HOSPITAL COURSE:  for full details see H&P, daily progress notes, labs, consult notes. DYLAN POA on CKD stage IV 
-likely progression of her CKD Cr today 7.53 , stable since admission, didn't improve with IVF  
-nephrology help appreciated Dr Clarisa Holguin: She is not a dialysis candidate. The patient's refusal of dialysis for last 1-1/2 years suggest that she would not be a good dialysis candidate. -seen by palliative care team.  
Per last note: 
-Patient difficult to remain on task today.  Much discussion of past events, repetition about people lying, medication SE, etc.  
-All understand that dialysis is not an option, that kidneys are not working well, that life expectancy is limited but unknown. Patient presents as a bit paranoid and has firmly fixed ideas/ concerns about taking ANY new medication. (Family says this is longstanding, nothing new) - Patient completed mPOA portion of AMD, appointing her son Rafael Hartley as mPOA (no alternate) Patient states when God says it's her time, she wants to go, would NOT want to be hooked to machines or have CPR. We talked about how CPR/Shock would not change her kidney failure.   Discussed importance of DDNR form, in order to honor her wishes. Shana Gomes is not ready to sign today, as she says she needs to pray about it first. Froylan Kellyl how important these conversations are, and how Alysha Cruz will know how to honor her wishes in the future, he is hearing her now say her wish to have a natural dying when that time comes.  We talked about how they can get the form signed in the future if she wants it (here or at [facility])  Waldemar is going to take the blank DDNR with him, so he can approach MD at SNF if needed if she is ready to sign later. She remains FULL CODE for now 
  
Anemia of CKD 
-Hb is stable 
-on iron and epo per renal  
  
Hypertension, benign/essential 
-/150s now She stopped all her meds some time ago 
- clonidine patch ordered on admission to try and improve compliance, continue Norvasc added to regimen this admission - prn nitrobid 
  
Legally blind Hypernatremia due to dehydration, resolved Noncompliance Hypoglycemia, likely was nutritional, resolved Chronic bilateral hip pain Hypoalbuminemia  
  
  
  
Code status: Full Seen by palliaitve team this admission, pls see full note 11/14 Surrogate Decision Maker: son Carrie Mcclendon Prophylaxis: Hep SQ ( pt was declining)  
  Baseline: rent a room from her ;  legally blind ; Patient is , she has 5 adult children (Reina Gentleman, [address], [place] and [address]). Recommended Disposition: [facility] today then transition to LTC 
 
 
_______________________________________________________________________ Patient seen and examined by me on discharge day. PHYSICAL EXAM: 
General:          WD, WN. Alert, cooperative, no acute distress   
EENT:              EOMI. Anicteric sclerae. MMM Resp:               CTA bilaterally, no wheezing or rales. No accessory muscle use CV:                  Regular  rhythm,  No edema GI:                   Soft, Non distended, Non tender.  +Bowel sounds Neurologic:      Alert and oriented X 3, normal speech, Psych:             Good insight. Not anxious nor agitated Skin:                No rashes. No jaundice 
_______________________________________________________________________ DISCHARGE MEDICATIONS:  
Current Discharge Medication List  
  
START taking these medications Details  
amLODIPine (NORVASC) 10 mg tablet Take 1 Tab by mouth daily. Qty: 30 Tab, Refills: 0  
  
cloNIDine (CATAPRES) 0.2 mg/24 hr ptwk 1 Patch by TransDERmal route every seven (7) days. Qty: 1 Patch, Refills: 0  
  
epoetin jackelyn (EPOGEN;PROCRIT) 10,000 unit/mL injection 1 mL by SubCUTAneous route every Monday, Wednesday, Friday. Qty: 1 Vial, Refills: 0 STOP taking these medications  
  
 hydrALAZINE (APRESOLINE) 50 mg tablet Comments:  
Reason for Stopping:   
   
 bumetanide (BUMEX) 2 mg tablet Comments:  
Reason for Stopping:   
   
 carvedilol (COREG) 12.5 mg tablet Comments:  
Reason for Stopping: My Recommended Diet, Activity, Wound Care, and follow-up labs are listed in the patient's Discharge Insturctions which I have personally completed and reviewed. ______________________________________________________________________ Risk of deterioration: High 
 
Condition at Discharge:  Stable 
______________________________________________________________________ Disposition SNF/LTC 
______________________________________________________________________ Care Plan discussed with:  
Patient, RN, Care Manager 
 
______________________________________________________________________ Code Status: Full Code 
______________________________________________________________________ Follow up with: PCP : Carine Stallings NP Follow-up Information Follow up With Specialties Details Why Contact Info Carine Stallings NP Nurse Practitioner   [facility] [address] [place] [address] [phone] Toña Rand MD Nephrology In 2 weeks  [place] [address] [address] 
[phone] Total time in minutes spent coordinating this discharge (includes going over instructions, follow-up, prescriptions, and preparing report for sign off to her PCP) :  > 30  minutes Signed: 
Lou Zepeda MD

## 2018-11-21 NOTE — PROGRESS NOTES
I successfully submitted and processed. Copy mailed to [id number] and FAX'd to [address]. Copy sent to medical records for scanning into chart. Jose R Hernandez RN, BSN, AC  - Medical Oncology [phone]

## 2019-01-01 ENCOUNTER — HOME CARE VISIT (OUTPATIENT)
Dept: HOSPICE | Facility: HOSPICE | Age: 64
End: 2019-01-01
Payer: MEDICAID

## 2019-01-01 ENCOUNTER — HOME CARE VISIT (OUTPATIENT)
Dept: SCHEDULING | Facility: HOME HEALTH | Age: 64
End: 2019-01-01
Payer: MEDICAID

## 2019-01-01 ENCOUNTER — HOSPICE ADMISSION (OUTPATIENT)
Dept: HOSPICE | Facility: HOSPICE | Age: 64
End: 2019-01-01
Payer: MEDICAID

## 2019-01-01 ENCOUNTER — HOSPITAL ENCOUNTER (INPATIENT)
Age: 64
LOS: 3 days | Discharge: HOSPICE | DRG: 460 | End: 2019-03-02
Attending: EMERGENCY MEDICINE | Admitting: GENERAL ACUTE CARE HOSPITAL
Payer: MEDICAID

## 2019-01-01 ENCOUNTER — APPOINTMENT (OUTPATIENT)
Dept: CT IMAGING | Age: 64
DRG: 460 | End: 2019-01-01
Attending: EMERGENCY MEDICINE
Payer: MEDICAID

## 2019-01-01 VITALS
OXYGEN SATURATION: 98 % | HEART RATE: 95 BPM | SYSTOLIC BLOOD PRESSURE: 122 MMHG | DIASTOLIC BLOOD PRESSURE: 70 MMHG | RESPIRATION RATE: 16 BRPM

## 2019-01-01 VITALS
OXYGEN SATURATION: 98 % | SYSTOLIC BLOOD PRESSURE: 150 MMHG | DIASTOLIC BLOOD PRESSURE: 90 MMHG | HEART RATE: 88 BPM | RESPIRATION RATE: 16 BRPM

## 2019-01-01 VITALS
SYSTOLIC BLOOD PRESSURE: 144 MMHG | TEMPERATURE: 98.4 F | BODY MASS INDEX: 20.49 KG/M2 | WEIGHT: 120 LBS | HEART RATE: 85 BPM | DIASTOLIC BLOOD PRESSURE: 75 MMHG | OXYGEN SATURATION: 99 % | HEIGHT: 64 IN | RESPIRATION RATE: 18 BRPM

## 2019-01-01 VITALS
DIASTOLIC BLOOD PRESSURE: 88 MMHG | RESPIRATION RATE: 16 BRPM | HEART RATE: 105 BPM | OXYGEN SATURATION: 98 % | SYSTOLIC BLOOD PRESSURE: 138 MMHG

## 2019-01-01 VITALS
DIASTOLIC BLOOD PRESSURE: 64 MMHG | SYSTOLIC BLOOD PRESSURE: 128 MMHG | HEART RATE: 82 BPM | OXYGEN SATURATION: 96 % | RESPIRATION RATE: 16 BRPM

## 2019-01-01 VITALS
RESPIRATION RATE: 18 BRPM | OXYGEN SATURATION: 98 % | DIASTOLIC BLOOD PRESSURE: 78 MMHG | HEART RATE: 84 BPM | SYSTOLIC BLOOD PRESSURE: 140 MMHG

## 2019-01-01 VITALS
RESPIRATION RATE: 16 BRPM | SYSTOLIC BLOOD PRESSURE: 140 MMHG | HEART RATE: 85 BPM | OXYGEN SATURATION: 97 % | DIASTOLIC BLOOD PRESSURE: 80 MMHG

## 2019-01-01 VITALS
OXYGEN SATURATION: 96 % | DIASTOLIC BLOOD PRESSURE: 60 MMHG | RESPIRATION RATE: 16 BRPM | SYSTOLIC BLOOD PRESSURE: 130 MMHG | HEART RATE: 75 BPM

## 2019-01-01 VITALS
OXYGEN SATURATION: 98 % | HEART RATE: 98 BPM | DIASTOLIC BLOOD PRESSURE: 85 MMHG | RESPIRATION RATE: 16 BRPM | SYSTOLIC BLOOD PRESSURE: 140 MMHG

## 2019-01-01 VITALS
BODY MASS INDEX: 20.47 KG/M2 | SYSTOLIC BLOOD PRESSURE: 140 MMHG | HEIGHT: 64 IN | OXYGEN SATURATION: 98 % | WEIGHT: 119.93 LBS | RESPIRATION RATE: 18 BRPM | HEART RATE: 84 BPM | DIASTOLIC BLOOD PRESSURE: 78 MMHG

## 2019-01-01 VITALS
DIASTOLIC BLOOD PRESSURE: 80 MMHG | SYSTOLIC BLOOD PRESSURE: 150 MMHG | RESPIRATION RATE: 16 BRPM | OXYGEN SATURATION: 99 % | HEART RATE: 93 BPM

## 2019-01-01 VITALS
HEART RATE: 95 BPM | SYSTOLIC BLOOD PRESSURE: 140 MMHG | RESPIRATION RATE: 16 BRPM | OXYGEN SATURATION: 96 % | DIASTOLIC BLOOD PRESSURE: 70 MMHG

## 2019-01-01 DIAGNOSIS — S32.030A CLOSED COMPRESSION FRACTURE OF THIRD LUMBAR VERTEBRA, INITIAL ENCOUNTER: ICD-10-CM

## 2019-01-01 DIAGNOSIS — R11.15 INTRACTABLE CYCLICAL VOMITING WITH NAUSEA: ICD-10-CM

## 2019-01-01 DIAGNOSIS — E87.20 METABOLIC ACIDOSIS: ICD-10-CM

## 2019-01-01 DIAGNOSIS — N39.0 URINARY TRACT INFECTION WITHOUT HEMATURIA, SITE UNSPECIFIED: ICD-10-CM

## 2019-01-01 DIAGNOSIS — N17.9 ACUTE RENAL FAILURE SUPERIMPOSED ON STAGE 5 CHRONIC KIDNEY DISEASE, NOT ON CHRONIC DIALYSIS, UNSPECIFIED ACUTE RENAL FAILURE TYPE (HCC): Primary | ICD-10-CM

## 2019-01-01 DIAGNOSIS — Z71.89 GOALS OF CARE, COUNSELING/DISCUSSION: ICD-10-CM

## 2019-01-01 DIAGNOSIS — N18.5 ACUTE RENAL FAILURE SUPERIMPOSED ON STAGE 5 CHRONIC KIDNEY DISEASE, NOT ON CHRONIC DIALYSIS, UNSPECIFIED ACUTE RENAL FAILURE TYPE (HCC): Primary | ICD-10-CM

## 2019-01-01 DIAGNOSIS — S32.029A CLOSED FRACTURE OF SECOND LUMBAR VERTEBRA, UNSPECIFIED FRACTURE MORPHOLOGY, INITIAL ENCOUNTER (HCC): ICD-10-CM

## 2019-01-01 DIAGNOSIS — G93.41 METABOLIC ENCEPHALOPATHY: ICD-10-CM

## 2019-01-01 LAB
ALBUMIN SERPL-MCNC: 3.9 G/DL (ref 3.5–5)
ALBUMIN/GLOB SERPL: 0.7 {RATIO} (ref 1.1–2.2)
ALP SERPL-CCNC: 206 U/L (ref 45–117)
ALT SERPL-CCNC: 13 U/L (ref 12–78)
ANION GAP SERPL CALC-SCNC: 14 MMOL/L (ref 5–15)
ANION GAP SERPL CALC-SCNC: 15 MMOL/L (ref 5–15)
ANION GAP SERPL CALC-SCNC: 17 MMOL/L (ref 5–15)
APPEARANCE UR: ABNORMAL
AST SERPL-CCNC: 16 U/L (ref 15–37)
ATRIAL RATE: 101 BPM
BACTERIA SPEC CULT: ABNORMAL
BACTERIA SPEC CULT: ABNORMAL
BACTERIA URNS QL MICRO: ABNORMAL /HPF
BASOPHILS # BLD: 0 K/UL (ref 0–0.1)
BASOPHILS # BLD: 0 K/UL (ref 0–0.1)
BASOPHILS NFR BLD: 1 % (ref 0–1)
BASOPHILS NFR BLD: 1 % (ref 0–1)
BILIRUB SERPL-MCNC: 0.5 MG/DL (ref 0.2–1)
BILIRUB UR QL: NEGATIVE
BUN SERPL-MCNC: 114 MG/DL (ref 6–20)
BUN SERPL-MCNC: 126 MG/DL (ref 6–20)
BUN SERPL-MCNC: 132 MG/DL (ref 6–20)
BUN/CREAT SERPL: 9 (ref 12–20)
CALCIUM SERPL-MCNC: 7.3 MG/DL (ref 8.5–10.1)
CALCIUM SERPL-MCNC: 7.6 MG/DL (ref 8.5–10.1)
CALCIUM SERPL-MCNC: 8.5 MG/DL (ref 8.5–10.1)
CALCULATED P AXIS, ECG09: 57 DEGREES
CALCULATED R AXIS, ECG10: 61 DEGREES
CALCULATED T AXIS, ECG11: 25 DEGREES
CC UR VC: ABNORMAL
CHLORIDE SERPL-SCNC: 104 MMOL/L (ref 97–108)
CHLORIDE SERPL-SCNC: 105 MMOL/L (ref 97–108)
CHLORIDE SERPL-SCNC: 107 MMOL/L (ref 97–108)
CO2 SERPL-SCNC: 14 MMOL/L (ref 21–32)
CO2 SERPL-SCNC: 17 MMOL/L (ref 21–32)
CO2 SERPL-SCNC: 23 MMOL/L (ref 21–32)
COLOR UR: ABNORMAL
CREAT SERPL-MCNC: 12.9 MG/DL (ref 0.55–1.02)
CREAT SERPL-MCNC: 14.1 MG/DL (ref 0.55–1.02)
CREAT SERPL-MCNC: 14.9 MG/DL (ref 0.55–1.02)
DIAGNOSIS, 93000: NORMAL
DIFFERENTIAL METHOD BLD: ABNORMAL
DIFFERENTIAL METHOD BLD: ABNORMAL
EOSINOPHIL # BLD: 0.1 K/UL (ref 0–0.4)
EOSINOPHIL # BLD: 0.1 K/UL (ref 0–0.4)
EOSINOPHIL NFR BLD: 2 % (ref 0–7)
EOSINOPHIL NFR BLD: 3 % (ref 0–7)
EPITH CASTS URNS QL MICRO: ABNORMAL /LPF
ERYTHROCYTE [DISTWIDTH] IN BLOOD BY AUTOMATED COUNT: 13.2 % (ref 11.5–14.5)
ERYTHROCYTE [DISTWIDTH] IN BLOOD BY AUTOMATED COUNT: 13.2 % (ref 11.5–14.5)
ERYTHROCYTE [DISTWIDTH] IN BLOOD BY AUTOMATED COUNT: 13.3 % (ref 11.5–14.5)
GLOBULIN SER CALC-MCNC: 5.3 G/DL (ref 2–4)
GLUCOSE BLD STRIP.AUTO-MCNC: 111 MG/DL (ref 65–100)
GLUCOSE BLD STRIP.AUTO-MCNC: 119 MG/DL (ref 65–100)
GLUCOSE BLD STRIP.AUTO-MCNC: 129 MG/DL (ref 65–100)
GLUCOSE BLD STRIP.AUTO-MCNC: 137 MG/DL (ref 65–100)
GLUCOSE BLD STRIP.AUTO-MCNC: 153 MG/DL (ref 65–100)
GLUCOSE BLD STRIP.AUTO-MCNC: 163 MG/DL (ref 65–100)
GLUCOSE BLD STRIP.AUTO-MCNC: 228 MG/DL (ref 65–100)
GLUCOSE BLD STRIP.AUTO-MCNC: 229 MG/DL (ref 65–100)
GLUCOSE BLD STRIP.AUTO-MCNC: 61 MG/DL (ref 65–100)
GLUCOSE BLD STRIP.AUTO-MCNC: 64 MG/DL (ref 65–100)
GLUCOSE BLD STRIP.AUTO-MCNC: 66 MG/DL (ref 65–100)
GLUCOSE BLD STRIP.AUTO-MCNC: 73 MG/DL (ref 65–100)
GLUCOSE BLD STRIP.AUTO-MCNC: 81 MG/DL (ref 65–100)
GLUCOSE BLD STRIP.AUTO-MCNC: 84 MG/DL (ref 65–100)
GLUCOSE BLD STRIP.AUTO-MCNC: 89 MG/DL (ref 65–100)
GLUCOSE BLD STRIP.AUTO-MCNC: 96 MG/DL (ref 65–100)
GLUCOSE SERPL-MCNC: 115 MG/DL (ref 65–100)
GLUCOSE SERPL-MCNC: 161 MG/DL (ref 65–100)
GLUCOSE SERPL-MCNC: 91 MG/DL (ref 65–100)
GLUCOSE UR STRIP.AUTO-MCNC: NEGATIVE MG/DL
HCT VFR BLD AUTO: 29.3 % (ref 35–47)
HCT VFR BLD AUTO: 29.8 % (ref 35–47)
HCT VFR BLD AUTO: 35.5 % (ref 35–47)
HGB BLD-MCNC: 11.3 G/DL (ref 11.5–16)
HGB BLD-MCNC: 9.3 G/DL (ref 11.5–16)
HGB BLD-MCNC: 9.5 G/DL (ref 11.5–16)
HGB UR QL STRIP: ABNORMAL
IMM GRANULOCYTES # BLD AUTO: 0 K/UL (ref 0–0.04)
IMM GRANULOCYTES # BLD AUTO: 0 K/UL (ref 0–0.04)
IMM GRANULOCYTES NFR BLD AUTO: 0 % (ref 0–0.5)
IMM GRANULOCYTES NFR BLD AUTO: 0 % (ref 0–0.5)
KETONES UR QL STRIP.AUTO: NEGATIVE MG/DL
LEUKOCYTE ESTERASE UR QL STRIP.AUTO: ABNORMAL
LIPASE SERPL-CCNC: 130 U/L (ref 73–393)
LYMPHOCYTES # BLD: 0.9 K/UL (ref 0.8–3.5)
LYMPHOCYTES # BLD: 1 K/UL (ref 0.8–3.5)
LYMPHOCYTES NFR BLD: 18 % (ref 12–49)
LYMPHOCYTES NFR BLD: 20 % (ref 12–49)
MAGNESIUM SERPL-MCNC: 2.1 MG/DL (ref 1.6–2.4)
MCH RBC QN AUTO: 26.6 PG (ref 26–34)
MCH RBC QN AUTO: 26.7 PG (ref 26–34)
MCH RBC QN AUTO: 27.2 PG (ref 26–34)
MCHC RBC AUTO-ENTMCNC: 31.2 G/DL (ref 30–36.5)
MCHC RBC AUTO-ENTMCNC: 31.8 G/DL (ref 30–36.5)
MCHC RBC AUTO-ENTMCNC: 32.4 G/DL (ref 30–36.5)
MCV RBC AUTO: 83.7 FL (ref 80–99)
MCV RBC AUTO: 84 FL (ref 80–99)
MCV RBC AUTO: 85.1 FL (ref 80–99)
MONOCYTES # BLD: 0.4 K/UL (ref 0–1)
MONOCYTES # BLD: 0.5 K/UL (ref 0–1)
MONOCYTES NFR BLD: 8 % (ref 5–13)
MONOCYTES NFR BLD: 9 % (ref 5–13)
NEUTS SEG # BLD: 3.4 K/UL (ref 1.8–8)
NEUTS SEG # BLD: 3.7 K/UL (ref 1.8–8)
NEUTS SEG NFR BLD: 68 % (ref 32–75)
NEUTS SEG NFR BLD: 71 % (ref 32–75)
NITRITE UR QL STRIP.AUTO: NEGATIVE
NRBC # BLD: 0 K/UL (ref 0–0.01)
NRBC # BLD: 0 K/UL (ref 0–0.01)
NRBC # BLD: 0.02 K/UL (ref 0–0.01)
NRBC BLD-RTO: 0 PER 100 WBC
NRBC BLD-RTO: 0 PER 100 WBC
NRBC BLD-RTO: 0.4 PER 100 WBC
P-R INTERVAL, ECG05: 144 MS
PH UR STRIP: 5 [PH] (ref 5–8)
PHOSPHATE SERPL-MCNC: 7.9 MG/DL (ref 2.6–4.7)
PLATELET # BLD AUTO: 193 K/UL (ref 150–400)
PLATELET # BLD AUTO: 201 K/UL (ref 150–400)
PLATELET # BLD AUTO: 215 K/UL (ref 150–400)
PMV BLD AUTO: 11.8 FL (ref 8.9–12.9)
PMV BLD AUTO: 11.8 FL (ref 8.9–12.9)
PMV BLD AUTO: 12.2 FL (ref 8.9–12.9)
POTASSIUM SERPL-SCNC: 3.3 MMOL/L (ref 3.5–5.1)
POTASSIUM SERPL-SCNC: 4.1 MMOL/L (ref 3.5–5.1)
POTASSIUM SERPL-SCNC: 4.7 MMOL/L (ref 3.5–5.1)
PROT SERPL-MCNC: 9.2 G/DL (ref 6.4–8.2)
PROT UR STRIP-MCNC: 100 MG/DL
Q-T INTERVAL, ECG07: 342 MS
QRS DURATION, ECG06: 84 MS
QTC CALCULATION (BEZET), ECG08: 443 MS
RBC # BLD AUTO: 3.49 M/UL (ref 3.8–5.2)
RBC # BLD AUTO: 3.5 M/UL (ref 3.8–5.2)
RBC # BLD AUTO: 4.24 M/UL (ref 3.8–5.2)
RBC #/AREA URNS HPF: ABNORMAL /HPF (ref 0–5)
SERVICE CMNT-IMP: ABNORMAL
SERVICE CMNT-IMP: NORMAL
SODIUM SERPL-SCNC: 136 MMOL/L (ref 136–145)
SODIUM SERPL-SCNC: 138 MMOL/L (ref 136–145)
SODIUM SERPL-SCNC: 142 MMOL/L (ref 136–145)
SP GR UR REFRACTOMETRY: 1.02 (ref 1–1.03)
UA: UC IF INDICATED,UAUC: ABNORMAL
UROBILINOGEN UR QL STRIP.AUTO: 0.2 EU/DL (ref 0.2–1)
VENTRICULAR RATE, ECG03: 101 BPM
WBC # BLD AUTO: 4.3 K/UL (ref 3.6–11)
WBC # BLD AUTO: 5 K/UL (ref 3.6–11)
WBC # BLD AUTO: 5.2 K/UL (ref 3.6–11)
WBC URNS QL MICRO: ABNORMAL /HPF (ref 0–4)

## 2019-01-01 PROCEDURE — 3336590001 HSPC ROOM AND BOARD

## 2019-01-01 PROCEDURE — 65660000000 HC RM CCU STEPDOWN

## 2019-01-01 PROCEDURE — G0156 HHCP-SVS OF AIDE,EA 15 MIN: HCPCS

## 2019-01-01 PROCEDURE — 0651 HSPC ROUTINE HOME CARE

## 2019-01-01 PROCEDURE — 87077 CULTURE AEROBIC IDENTIFY: CPT

## 2019-01-01 PROCEDURE — G0299 HHS/HOSPICE OF RN EA 15 MIN: HCPCS

## 2019-01-01 PROCEDURE — 74011000258 HC RX REV CODE- 258: Performed by: INTERNAL MEDICINE

## 2019-01-01 PROCEDURE — 74011000250 HC RX REV CODE- 250: Performed by: INTERNAL MEDICINE

## 2019-01-01 PROCEDURE — 74011636637 HC RX REV CODE- 636/637: Performed by: GENERAL ACUTE CARE HOSPITAL

## 2019-01-01 PROCEDURE — 74011250637 HC RX REV CODE- 250/637: Performed by: INTERNAL MEDICINE

## 2019-01-01 PROCEDURE — G0155 HHCP-SVS OF CSW,EA 15 MIN: HCPCS

## 2019-01-01 PROCEDURE — 82962 GLUCOSE BLOOD TEST: CPT

## 2019-01-01 PROCEDURE — 74011250636 HC RX REV CODE- 250/636: Performed by: GENERAL ACUTE CARE HOSPITAL

## 2019-01-01 PROCEDURE — PB104 HC HSPC R&B RUG RATE

## 2019-01-01 PROCEDURE — 85027 COMPLETE CBC AUTOMATED: CPT

## 2019-01-01 PROCEDURE — 74011250636 HC RX REV CODE- 250/636: Performed by: INTERNAL MEDICINE

## 2019-01-01 PROCEDURE — 3330220001 HC HSPC R&B RUG RATE

## 2019-01-01 PROCEDURE — 85025 COMPLETE CBC W/AUTO DIFF WBC: CPT

## 2019-01-01 PROCEDURE — 81001 URINALYSIS AUTO W/SCOPE: CPT

## 2019-01-01 PROCEDURE — HOSPICE MEDICATION HC HH HOSPICE MEDICATION

## 2019-01-01 PROCEDURE — 36415 COLL VENOUS BLD VENIPUNCTURE: CPT

## 2019-01-01 PROCEDURE — 80048 BASIC METABOLIC PNL TOTAL CA: CPT

## 2019-01-01 PROCEDURE — 87186 SC STD MICRODIL/AGAR DIL: CPT

## 2019-01-01 PROCEDURE — 74011000250 HC RX REV CODE- 250: Performed by: GENERAL ACUTE CARE HOSPITAL

## 2019-01-01 PROCEDURE — 74011250637 HC RX REV CODE- 250/637: Performed by: GENERAL ACUTE CARE HOSPITAL

## 2019-01-01 PROCEDURE — 93005 ELECTROCARDIOGRAM TRACING: CPT

## 2019-01-01 PROCEDURE — 94761 N-INVAS EAR/PLS OXIMETRY MLT: CPT

## 2019-01-01 PROCEDURE — G0300 HHS/HOSPICE OF LPN EA 15 MIN: HCPCS

## 2019-01-01 PROCEDURE — 96375 TX/PRO/DX INJ NEW DRUG ADDON: CPT

## 2019-01-01 PROCEDURE — 74176 CT ABD & PELVIS W/O CONTRAST: CPT

## 2019-01-01 PROCEDURE — 99285 EMERGENCY DEPT VISIT HI MDM: CPT

## 2019-01-01 PROCEDURE — 51701 INSERT BLADDER CATHETER: CPT

## 2019-01-01 PROCEDURE — 77030005563 HC CATH URETH INT MMGH -A

## 2019-01-01 PROCEDURE — 74011250636 HC RX REV CODE- 250/636: Performed by: EMERGENCY MEDICINE

## 2019-01-01 PROCEDURE — 87147 CULTURE TYPE IMMUNOLOGIC: CPT

## 2019-01-01 PROCEDURE — 83690 ASSAY OF LIPASE: CPT

## 2019-01-01 PROCEDURE — 76450000000

## 2019-01-01 PROCEDURE — HB102 HC HSPC R&B RUG RATE

## 2019-01-01 PROCEDURE — 3336500001 HSPC ELECTION

## 2019-01-01 PROCEDURE — 96374 THER/PROPH/DIAG INJ IV PUSH: CPT

## 2019-01-01 PROCEDURE — 80053 COMPREHEN METABOLIC PANEL: CPT

## 2019-01-01 PROCEDURE — 94760 N-INVAS EAR/PLS OXIMETRY 1: CPT

## 2019-01-01 PROCEDURE — 84100 ASSAY OF PHOSPHORUS: CPT

## 2019-01-01 PROCEDURE — 87086 URINE CULTURE/COLONY COUNT: CPT

## 2019-01-01 PROCEDURE — 83735 ASSAY OF MAGNESIUM: CPT

## 2019-01-01 RX ORDER — SODIUM CHLORIDE 9 MG/ML
75 INJECTION, SOLUTION INTRAVENOUS CONTINUOUS
Status: DISCONTINUED | OUTPATIENT
Start: 2019-01-01 | End: 2019-01-01

## 2019-01-01 RX ORDER — SODIUM BICARBONATE IN D5W 150/1000ML
PLASTIC BAG, INJECTION (ML) INTRAVENOUS CONTINUOUS
Status: DISPENSED | OUTPATIENT
Start: 2019-01-01 | End: 2019-01-01

## 2019-01-01 RX ORDER — HEPARIN SODIUM 5000 [USP'U]/ML
5000 INJECTION, SOLUTION INTRAVENOUS; SUBCUTANEOUS EVERY 8 HOURS
Status: DISCONTINUED | OUTPATIENT
Start: 2019-01-01 | End: 2019-01-01

## 2019-01-01 RX ORDER — ONDANSETRON 2 MG/ML
4 INJECTION INTRAMUSCULAR; INTRAVENOUS
Status: COMPLETED | OUTPATIENT
Start: 2019-01-01 | End: 2019-01-01

## 2019-01-01 RX ORDER — DEXTROSE MONOHYDRATE AND SODIUM CHLORIDE 5; .9 G/100ML; G/100ML
75 INJECTION, SOLUTION INTRAVENOUS CONTINUOUS
Status: DISCONTINUED | OUTPATIENT
Start: 2019-01-01 | End: 2019-01-01

## 2019-01-01 RX ORDER — POTASSIUM CHLORIDE 750 MG/1
20 TABLET, FILM COATED, EXTENDED RELEASE ORAL
Status: COMPLETED | OUTPATIENT
Start: 2019-01-01 | End: 2019-01-01

## 2019-01-01 RX ORDER — SODIUM CHLORIDE 0.9 % (FLUSH) 0.9 %
5-40 SYRINGE (ML) INJECTION EVERY 8 HOURS
Status: DISCONTINUED | OUTPATIENT
Start: 2019-01-01 | End: 2019-01-01 | Stop reason: HOSPADM

## 2019-01-01 RX ORDER — SODIUM CHLORIDE 0.9 % (FLUSH) 0.9 %
5-40 SYRINGE (ML) INJECTION AS NEEDED
Status: DISCONTINUED | OUTPATIENT
Start: 2019-01-01 | End: 2019-01-01 | Stop reason: HOSPADM

## 2019-01-01 RX ORDER — MAGNESIUM SULFATE 100 %
4 CRYSTALS MISCELLANEOUS AS NEEDED
Status: DISCONTINUED | OUTPATIENT
Start: 2019-01-01 | End: 2019-01-01 | Stop reason: HOSPADM

## 2019-01-01 RX ORDER — DOCUSATE SODIUM 100 MG/1
100 CAPSULE, LIQUID FILLED ORAL 2 TIMES DAILY
Status: DISCONTINUED | OUTPATIENT
Start: 2019-01-01 | End: 2019-01-01 | Stop reason: HOSPADM

## 2019-01-01 RX ORDER — SODIUM CHLORIDE 450 MG/100ML
75 INJECTION, SOLUTION INTRAVENOUS CONTINUOUS
Status: DISCONTINUED | OUTPATIENT
Start: 2019-01-01 | End: 2019-01-01 | Stop reason: HOSPADM

## 2019-01-01 RX ORDER — HEPARIN SODIUM 5000 [USP'U]/ML
5000 INJECTION, SOLUTION INTRAVENOUS; SUBCUTANEOUS EVERY 12 HOURS
Status: DISCONTINUED | OUTPATIENT
Start: 2019-01-01 | End: 2019-01-01 | Stop reason: HOSPADM

## 2019-01-01 RX ORDER — SODIUM BICARBONATE 650 MG/1
650 TABLET ORAL 3 TIMES DAILY
Status: DISCONTINUED | OUTPATIENT
Start: 2019-01-01 | End: 2019-01-01 | Stop reason: HOSPADM

## 2019-01-01 RX ORDER — OMEPRAZOLE 20 MG/1
20 CAPSULE, DELAYED RELEASE ORAL DAILY
COMMUNITY

## 2019-01-01 RX ORDER — INSULIN LISPRO 100 [IU]/ML
INJECTION, SOLUTION INTRAVENOUS; SUBCUTANEOUS
Status: DISCONTINUED | OUTPATIENT
Start: 2019-01-01 | End: 2019-01-01 | Stop reason: HOSPADM

## 2019-01-01 RX ORDER — ONDANSETRON 2 MG/ML
4 INJECTION INTRAMUSCULAR; INTRAVENOUS
Status: DISCONTINUED | OUTPATIENT
Start: 2019-01-01 | End: 2019-01-01 | Stop reason: HOSPADM

## 2019-01-01 RX ORDER — ONDANSETRON 4 MG/1
4 TABLET, FILM COATED ORAL
COMMUNITY
End: 2019-01-01

## 2019-01-01 RX ORDER — AMLODIPINE BESYLATE 5 MG/1
10 TABLET ORAL DAILY
Status: DISCONTINUED | OUTPATIENT
Start: 2019-01-01 | End: 2019-01-01 | Stop reason: HOSPADM

## 2019-01-01 RX ORDER — DEXTROSE 50 % IN WATER (D50W) INTRAVENOUS SYRINGE
12.5-25 AS NEEDED
Status: DISCONTINUED | OUTPATIENT
Start: 2019-01-01 | End: 2019-01-01 | Stop reason: HOSPADM

## 2019-01-01 RX ORDER — MORPHINE SULFATE 2 MG/ML
4 INJECTION, SOLUTION INTRAMUSCULAR; INTRAVENOUS
Status: COMPLETED | OUTPATIENT
Start: 2019-01-01 | End: 2019-01-01

## 2019-01-01 RX ADMIN — DOCUSATE SODIUM 100 MG: 100 CAPSULE, LIQUID FILLED ORAL at 08:39

## 2019-01-01 RX ADMIN — SODIUM BICARBONATE: 84 INJECTION, SOLUTION INTRAVENOUS at 13:18

## 2019-01-01 RX ADMIN — Medication: at 02:08

## 2019-01-01 RX ADMIN — AMLODIPINE BESYLATE 10 MG: 5 TABLET ORAL at 08:40

## 2019-01-01 RX ADMIN — SODIUM CHLORIDE 1000 ML: 900 INJECTION, SOLUTION INTRAVENOUS at 22:45

## 2019-01-01 RX ADMIN — Medication: at 15:37

## 2019-01-01 RX ADMIN — Medication 10 ML: at 18:07

## 2019-01-01 RX ADMIN — Medication 10 ML: at 14:37

## 2019-01-01 RX ADMIN — HEPARIN SODIUM 5000 UNITS: 5000 INJECTION INTRAVENOUS; SUBCUTANEOUS at 23:16

## 2019-01-01 RX ADMIN — PROCHLORPERAZINE EDISYLATE 5 MG: 5 INJECTION INTRAMUSCULAR; INTRAVENOUS at 23:13

## 2019-01-01 RX ADMIN — ONDANSETRON 4 MG: 2 INJECTION INTRAMUSCULAR; INTRAVENOUS at 02:13

## 2019-01-01 RX ADMIN — POTASSIUM CHLORIDE 20 MEQ: 750 TABLET, EXTENDED RELEASE ORAL at 11:52

## 2019-01-01 RX ADMIN — ONDANSETRON 4 MG: 2 INJECTION INTRAMUSCULAR; INTRAVENOUS at 19:42

## 2019-01-01 RX ADMIN — ONDANSETRON 2 MG: 2 INJECTION INTRAMUSCULAR; INTRAVENOUS at 15:00

## 2019-01-01 RX ADMIN — HEPARIN SODIUM 5000 UNITS: 5000 INJECTION INTRAVENOUS; SUBCUTANEOUS at 20:42

## 2019-01-01 RX ADMIN — MORPHINE SULFATE 4 MG: 2 INJECTION, SOLUTION INTRAMUSCULAR; INTRAVENOUS at 19:43

## 2019-01-01 RX ADMIN — SODIUM CHLORIDE 75 ML/HR: 450 INJECTION, SOLUTION INTRAVENOUS at 11:20

## 2019-01-01 RX ADMIN — HEPARIN SODIUM 5000 UNITS: 5000 INJECTION INTRAVENOUS; SUBCUTANEOUS at 08:38

## 2019-01-01 RX ADMIN — Medication 10 ML: at 22:48

## 2019-01-01 RX ADMIN — Medication 10 MG: at 12:40

## 2019-01-01 RX ADMIN — ONDANSETRON 4 MG: 2 INJECTION INTRAMUSCULAR; INTRAVENOUS at 14:38

## 2019-01-01 RX ADMIN — DEXTROSE MONOHYDRATE AND SODIUM CHLORIDE 75 ML/HR: 5; .9 INJECTION, SOLUTION INTRAVENOUS at 05:47

## 2019-01-01 RX ADMIN — Medication 10 ML: at 06:07

## 2019-01-01 RX ADMIN — SODIUM CHLORIDE 75 ML/HR: 900 INJECTION, SOLUTION INTRAVENOUS at 02:27

## 2019-01-01 RX ADMIN — AMLODIPINE BESYLATE 10 MG: 5 TABLET ORAL at 09:36

## 2019-01-01 RX ADMIN — Medication 10 ML: at 23:19

## 2019-01-01 RX ADMIN — ONDANSETRON 4 MG: 2 INJECTION INTRAMUSCULAR; INTRAVENOUS at 20:49

## 2019-01-01 RX ADMIN — SODIUM BICARBONATE 650 MG: 650 TABLET ORAL at 16:46

## 2019-01-01 RX ADMIN — SODIUM CHLORIDE 75 ML/HR: 450 INJECTION, SOLUTION INTRAVENOUS at 00:18

## 2019-01-01 RX ADMIN — Medication 10 ML: at 05:53

## 2019-01-01 RX ADMIN — HEPARIN SODIUM 5000 UNITS: 5000 INJECTION INTRAVENOUS; SUBCUTANEOUS at 08:39

## 2019-01-01 RX ADMIN — HEPARIN SODIUM 5000 UNITS: 5000 INJECTION INTRAVENOUS; SUBCUTANEOUS at 09:36

## 2019-01-01 RX ADMIN — Medication 10 ML: at 01:34

## 2019-01-01 RX ADMIN — ONDANSETRON 4 MG: 2 INJECTION INTRAMUSCULAR; INTRAVENOUS at 17:55

## 2019-01-01 RX ADMIN — ONDANSETRON 4 MG: 2 INJECTION INTRAMUSCULAR; INTRAVENOUS at 01:33

## 2019-01-01 RX ADMIN — AMLODIPINE BESYLATE 10 MG: 5 TABLET ORAL at 08:44

## 2019-01-01 RX ADMIN — INSULIN LISPRO 2 UNITS: 100 INJECTION, SOLUTION INTRAVENOUS; SUBCUTANEOUS at 16:30

## 2019-01-01 RX ADMIN — ONDANSETRON 4 MG: 2 INJECTION INTRAMUSCULAR; INTRAVENOUS at 08:42

## 2019-01-01 RX ADMIN — DEXTROSE MONOHYDRATE 25 G: 25 INJECTION, SOLUTION INTRAVENOUS at 08:20

## 2019-01-01 RX ADMIN — Medication: at 15:40

## 2019-01-01 RX ADMIN — DOCUSATE SODIUM 100 MG: 100 CAPSULE, LIQUID FILLED ORAL at 18:05

## 2019-01-01 RX ADMIN — DOCUSATE SODIUM 100 MG: 100 CAPSULE, LIQUID FILLED ORAL at 09:36

## 2019-02-27 NOTE — ED PROVIDER NOTES
EMERGENCY DEPARTMENT HISTORY AND PHYSICAL EXAM 
 
 
Date: 2/27/2019 Patient Name: Seema Childers History of Presenting Illness Chief Complaint Patient presents with  Abdominal Pain  Vomiting History Provided By: Patient and EMS 
 
HPI: Seema Childers, 58 y.o. female with PMHx significant for HTN, CKD, DM, presents via EMS from [facility] to the ED with cc of new onset, intermittent, sharp, aching, LLQ abdominal pain and lower back pain persisting over the last week. Pt reports associated sx of nausea and vomiting x this morning as well. She expresses over the last week she has had intermittent abdominal pain with no h/o kidney stones or diverticulitis. Pt discloses no exacerbating factors . Additionally, pt specifically denies any recent fever, chills, headache, diarrhea, CP, SOB, lightheadedness, dizziness, numbness, weakness, tingling, BLE swelling, heart palpitations, urinary sxs, changes in BM, changes in PO intake, melena, hematochezia, cough, or congestion. PCP: Aleyda Raya NP 
 
PSHx: Significant for tubal ligation Social Hx: -tobacco , -EtOH, -Illicit Drugs There are no other complaints, changes or physical findings at this time. Past History Past Medical History: 
Past Medical History:  
Diagnosis Date  Arthritis  Chronic kidney disease 2018  
  stage 5/ not dialysis patient  Chronic pain   
 from arthritis  Diabetes ([address]   
 no meds  Diabetic neuropathy ([address]  Diabetic retinopathy ([address]  HTN (hypertension)  Legally blind Past Surgical History: 
Past Surgical History:  
Procedure Laterality Date  HX GYN    
 tubal ligation  VASCULAR SURGERY PROCEDURE UNLIST  4/6/16 Creation of AV fistula right arm Family History: 
Family History Problem Relation Age of Onset  Stroke Mother  Hypertension Mother  Diabetes Father  Cancer Sister  Stroke Sister  Diabetes Brother   
     2 brothers  Diabetes Paternal Grandmother Social History: 
Social History Tobacco Use  Smoking status: Never Smoker  Smokeless tobacco: Never Used Substance Use Topics  Alcohol use: No  
 Drug use: No  
 
 
Allergies: Allergies Allergen Reactions  Demerol [Meperidine] Swelling Other reaction(s): edema Medications: No current facility-administered medications on file prior to encounter. Current Outpatient Medications on File Prior to Encounter Medication Sig Dispense Refill  omeprazole (PRILOSEC) 20 mg capsule Take 20 mg by mouth daily.  amLODIPine (NORVASC) 10 mg tablet Take 1 Tab by mouth daily. 30 Tab 0  
 epoetin jackelyn (EPOGEN;PROCRIT) 10,000 unit/mL injection 1 mL by SubCUTAneous route every Monday, Wednesday, Friday. 1 Vial 0  
 ondansetron hcl (ZOFRAN) 4 mg tablet Take 4 mg by mouth every six (6) hours as needed for Nausea (and vomiting). Review of Systems Review of Systems Constitutional: Negative. Negative for chills and fever. HENT: Negative. Negative for congestion, facial swelling, rhinorrhea, sore throat, trouble swallowing and voice change. Eyes: Negative. Respiratory: Negative. Negative for apnea, cough, chest tightness, shortness of breath and wheezing. Cardiovascular: Negative. Negative for chest pain, palpitations and leg swelling. Gastrointestinal: Positive for abdominal pain (LLQ ), nausea and vomiting. Negative for abdominal distention, blood in stool, constipation and diarrhea. Endocrine: Negative. Negative for cold intolerance, heat intolerance and polyuria. Genitourinary: Negative. Negative for difficulty urinating, dysuria, flank pain, frequency, hematuria and urgency. Musculoskeletal: Positive for back pain (low). Negative for arthralgias, myalgias, neck pain and neck stiffness. Skin: Negative. Negative for color change and rash. Neurological: Negative. Negative for dizziness, syncope, facial asymmetry, speech difficulty, weakness, light-headedness, numbness and headaches. Hematological: Negative. Does not bruise/bleed easily. Psychiatric/Behavioral: Negative. Negative for confusion and self-injury. The patient is not nervous/anxious. Physical Exam  
Physical Exam  
Constitutional: She is oriented to person, place, and time. She appears toxic. She has a sickly appearance. She appears ill. She appears distressed. HENT:  
Head: Normocephalic and atraumatic. Mouth/Throat: Oropharynx is clear and moist. No oropharyngeal exudate. Eyes: Conjunctivae and EOM are normal. Pupils are equal, round, and reactive to light. Neck: Normal range of motion. Cardiovascular: Regular rhythm and normal heart sounds. Tachycardia present. Exam reveals no gallop and no friction rub. No murmur heard. Pulmonary/Chest: Effort normal and breath sounds normal. No respiratory distress. She has no wheezes. She has no rales. She exhibits no tenderness. Abdominal: Soft. Bowel sounds are normal. She exhibits no distension and no mass. There is tenderness in the left lower quadrant. There is no rebound and no guarding. Musculoskeletal: Normal range of motion. She exhibits no edema, tenderness or deformity. Neurological: She is alert and oriented to person, place, and time. She displays normal reflexes. No cranial nerve deficit. She exhibits normal muscle tone. Coordination normal.  
Skin: Skin is warm. No rash noted. She is diaphoretic. Psychiatric: She has a normal mood and affect. Nursing note and vitals reviewed. Diagnostic Study Results Labs - Recent Results (from the past 24 hour(s)) CBC WITH AUTOMATED DIFF Collection Time: 02/27/19  4:56 PM  
Result Value Ref Range WBC 5.2 3.6 - 11.0 K/uL  
 RBC 4.24 3.80 - 5.20 M/uL  
 HGB 11.3 (L) 11.5 - 16.0 g/dL HCT 35.5 35.0 - 47.0 %  MCV 83.7 80.0 - 99.0 FL  
 MCH 26.7 26.0 - 34.0 PG  
 MCHC 31.8 30.0 - 36.5 g/dL  
 RDW 13.2 11.5 - 14.5 % PLATELET 289 894 - 174 K/uL MPV 11.8 8.9 - 12.9 FL  
 NRBC 0.0 0  WBC ABSOLUTE NRBC 0.00 0.00 - 0.01 K/uL NEUTROPHILS 71 32 - 75 % LYMPHOCYTES 18 12 - 49 % MONOCYTES 8 5 - 13 % EOSINOPHILS 2 0 - 7 % BASOPHILS 1 0 - 1 % IMMATURE GRANULOCYTES 0 0.0 - 0.5 % ABS. NEUTROPHILS 3.7 1.8 - 8.0 K/UL  
 ABS. LYMPHOCYTES 0.9 0.8 - 3.5 K/UL  
 ABS. MONOCYTES 0.4 0.0 - 1.0 K/UL  
 ABS. EOSINOPHILS 0.1 0.0 - 0.4 K/UL  
 ABS. BASOPHILS 0.0 0.0 - 0.1 K/UL  
 ABS. IMM. GRANS. 0.0 0.00 - 0.04 K/UL  
 DF AUTOMATED METABOLIC PANEL, COMPREHENSIVE Collection Time: 02/27/19  4:56 PM  
Result Value Ref Range Sodium 136 136 - 145 mmol/L Potassium 4.7 3.5 - 5.1 mmol/L Chloride 104 97 - 108 mmol/L  
 CO2 17 (L) 21 - 32 mmol/L Anion gap 15 5 - 15 mmol/L Glucose 91 65 - 100 mg/dL  (H) 6 - 20 MG/DL Creatinine 14.90 (H) 0.55 - 1.02 MG/DL  
 BUN/Creatinine ratio 9 (L) 12 - 20 GFR est AA 3 (L) >60 ml/min/1.73m2 GFR est non-AA 2 (L) >60 ml/min/1.73m2 Calcium 8.5 8.5 - 10.1 MG/DL Bilirubin, total 0.5 0.2 - 1.0 MG/DL  
 ALT (SGPT) 13 12 - 78 U/L  
 AST (SGOT) 16 15 - 37 U/L Alk. phosphatase 206 (H) 45 - 117 U/L Protein, total 9.2 (H) 6.4 - 8.2 g/dL Albumin 3.9 3.5 - 5.0 g/dL Globulin 5.3 (H) 2.0 - 4.0 g/dL A-G Ratio 0.7 (L) 1.1 - 2.2 LIPASE Collection Time: 02/27/19  4:56 PM  
Result Value Ref Range Lipase 130 73 - 393 U/L  
EKG, 12 LEAD, INITIAL Collection Time: 02/27/19  5:02 PM  
Result Value Ref Range Ventricular Rate 101 BPM  
 Atrial Rate 101 BPM  
 P-R Interval 144 ms QRS Duration 84 ms Q-T Interval 342 ms QTC Calculation (Bezet) 443 ms Calculated P Axis 57 degrees Calculated R Axis 61 degrees Calculated T Axis 25 degrees Diagnosis Sinus tachycardia Nonspecific T wave abnormality When compared with ECG of 16-APR-2018 10:54, Nonspecific T wave abnormality now evident in Inferior leads Inverted T waves have replaced nonspecific T wave abnormality in Lateral  
leads URINALYSIS W/ REFLEX CULTURE Collection Time: 02/28/19  1:10 AM  
Result Value Ref Range Color YELLOW/STRAW Appearance CLOUDY (A) CLEAR Specific gravity 1.017 1.003 - 1.030    
 pH (UA) 5.0 5.0 - 8.0 Protein 100 (A) NEG mg/dL Glucose NEGATIVE  NEG mg/dL Ketone NEGATIVE  NEG mg/dL Bilirubin NEGATIVE  NEG Blood MODERATE (A) NEG Urobilinogen 0.2 0.2 - 1.0 EU/dL Nitrites NEGATIVE  NEG Leukocyte Esterase MODERATE (A) NEG    
 WBC PENDING /hpf  
 RBC PENDING /hpf Epithelial cells PENDING /lpf Bacteria PENDING /hpf  
 UA:UC IF INDICATED PENDING Radiologic Studies -  
CT ABD PELV WO CONT Final Result IMPRESSION:   
1. Partial destruction and collapse of the L2 vertebral body and there is  
approximately 1 cm of osseous retropulsion inferiorly. Central canal measures  
approximately 4 mm in AP diameter at this level. Compression and partial  
destruction of the superior aspect of the L3 vertebral body. No associated  
paraspinal edema. MR can be performed for further evaluation, as indicated. 2. No bowel obstruction, ileus or perforation. Medical Decision Making I am the first provider for this patient. I reviewed the vital signs, available nursing notes, past medical history, past surgical history, family history and social history. Vital Signs-Reviewed the patient's vital signs. Patient Vitals for the past 24 hrs: 
 Temp Pulse Resp BP SpO2  
02/28/19 0015  90 10 129/83 100 % 02/27/19 2345  93 10 133/75 100 % 02/27/19 2330  90 16 151/79 100 % 02/27/19 2315  89 11 142/77 100 % 02/27/19 2300  88 11 150/75 100 % 02/27/19 2245  87 12 135/78 100 % 02/27/19 2230  87 11 134/75 100 % 02/27/19 2215  85 11 128/78 100 % 02/27/19 2200  86 14 136/78 100 % 02/27/19 2145  94 18 144/83 95 % 02/27/19 2130  86 14 139/78 98 % 02/27/19 2115  87 10 124/75 98 % 02/27/19 2100  87 10 125/84 98 % 02/27/19 2045  85 13 127/73 100 % 02/27/19 2030  84  126/82 98 % 02/27/19 2015  86  119/78 99 % 02/27/19 2000  85  118/76 99 % 02/27/19 1945  98 15 (!) 127/93 100 % 02/27/19 1943  99 19 129/88 100 % 02/27/19 1815  (!) 103 16 143/87 100 % 02/27/19 1800  (!) 102 16 (!) 145/95 100 % 02/27/19 1745  98 16 154/81 100 % 02/27/19 1730  (!) 102 27 154/75 99 % 02/27/19 1715  100 18 146/89 100 % 02/27/19 1648 98.1 °F (36.7 °C) 100 16 (!) 130/96 100 % Pulse Oximetry Analysis - 100% on RA Cardiac Monitor:  
Rate: 100 bpm 
Rhythm: Normal Sinus Rhythm ED EKG interpretation: 1702 Rhythm: sinus tachycardia; and regular . Rate (approx.): 101; Axis: normal; P wave: normal; QRS interval: normal ; ST/T wave: nonspecific T wave abnormality; Other findings: non ischemic. This EKG was interpreted by Phyllis Hicks M.D. Records Reviewed: Nursing Notes, Old Medical Records, Previous electrocardiograms, Previous Radiology Studies and Previous Laboratory Studies Provider Notes (Medical Decision Making): Pt presents with acute abdominal pain; vital signs stable with currently a non-peritoneal exam; DDx includes: Gastroenteritis, hepatitis, pancreatitis, obstruction, appendicitis, viral illness, IBD, diverticulitis, mesenteric ischemia, AAA or descending dissection, ACS, kidney stone. Will get labs, treat symptomatically and obtain serial abdominal exams to determine if a CT is warranted. Will reassess and monitor closely. ED Course:  
Initial assessment performed. The patients presenting problems have been discussed, and they are in agreement with the care plan formulated and outlined with them. I have encouraged them to ask questions as they arise throughout their visit. I reviewed our electronic medical record system for any past medical records that were available that may contribute to the patient's current condition, the nursing notes and vital signs from today's visit. Monica Craven MD 
Medications Administered During ED Course: 
Medications  
sodium bicarbonate 150 mEq/1000 mL D5W (premix) ( IntraVENous Transfusion Completed 3/1/19 1153) [facility] injection 4 mg (4 mg IntraVENous Given 2/27/19 1942) morphine injection 4 mg (4 mg IntraVENous Given 2/27/19 1943)  
sodium chloride 0.9 % bolus infusion 1,000 mL (0 mL IntraVENous IV Completed 2/28/19 0230) [facility] injection 4 mg (4 mg IntraVENous Given 2/28/19 0133) potassium chloride SR (KLOR-CON 10) tablet 20 mEq (20 mEq Oral Given 3/1/19 1152) Progress Note:  
4:50 PM 
Pt reassessed. Per chart review, pt last admitted 11/2018 for the following: DYLAN POA on CKD stage IV Anemia of CKD Hypertension, benign/essential 
Legally blind Hypernatremia due to dehydration, resolved Noncompliance Hypoglycemia, likely was nutritional, resolved Chronic bilateral hip pain Hypoalbuminemia  
Code status: Full Progress Note: 
 
CONSULT NOTE: 
8:41 PM 
Priscilla Reddy MD spoke with Dr. Marie Rogers, Specialty: nephrology Discussed pt's hx, disposition, and available diagnostic and imaging results. Reviewed care plans. Consultant recommends continuing fluid resuscitation. He states pt is not a candidate for dialysis. Written by NILAY Mota, as dictated by Priscilla Reddy MD 
CONSULT NOTE:  
9:41 PM 
Priscilla Reddy MD spoke with Dr. Gurdeep Castorena, Specialty: neurosurgery Discussed pt's hx, disposition, and available diagnostic and imaging results. Reviewed care plans. Agree with management and plan thus far.  Consultant recommends there is no surgical intervention at this time and advises admitting the pt to the hospitalist. 
Priscilla Reddy MD 
 
CONSULT NOTE:  
10:12 PM 
 Charles Ny MD spoke with Dr. Hayden Hairstno, Specialty: Hospitalist 
Discussed pt's hx, disposition, and available diagnostic and imaging results. Reviewed care plans. Consultant will evaluate pt for admission. Written by NILAY Plaza, as dictated by Charles Ny MD. Critical Care Time: CRITICAL CARE NOTE : 
 
10:01 PM 
 
IMPENDING DETERIORATION -Airway, Respiratory, Cardiovascular, Metabolic and Renal 
ASSOCIATED RISK FACTORS - Hypotension, Shock, Hypoxia, Dysrhythmia, Metabolic changes and Dehydration MANAGEMENT- Bedside Assessment and Supervision of Care INTERPRETATION -  Xrays, CT Scan, Blood Gases, ECG, Blood Pressure and Cardiac Output Measures INTERVENTIONS - hemodynamic mngmt and Metobolic interventions CASE REVIEW - Hospitalist, Medical Sub-Specialist, Nursing, Family and PCP 
TREATMENT RESPONSE -Improved PERFORMED BY - Self NOTES   : 
 
I have spent 70 minutes of critical care time involved in lab review, consultations with specialist, family decision- making, bedside attention and documentation. During this entire length of time I was immediately available to the patient . Critical Care: The reason for providing this level of medical care for this critically ill patient was due to a critical illness that impaired one or more vital organ systems, such that there was a high probability of imminent or life threatening deterioration in the patient's condition. This care involved high complexity decision making to assess, manipulate, and support vital system functions, to treat this degree of vital organ system failure, and to prevent further life threatening deterioration of the patients condition. Charles Ny MD 
 
Disposition: 
Admit Note: 
10:12 PM 
Patient is being admitted to the hospital by Dr. Hayden Hairston. The results of their tests and reasons for their admission have been discussed with the patient and/or available family.  They convey their agreement and understanding for the need to be admitted and for their admission diagnosis. Written by Opal Menon, ED Scribe, as dictated by Nadine Madden MD. 
 
PLAN: 
1. Admission Diagnosis Clinical Impression: 1. Acute renal failure superimposed on stage 5 chronic kidney disease, not on chronic dialysis, unspecified acute renal failure type ([facility] 2. Goals of care, counseling/discussion 3. Metabolic encephalopathy 4. Metabolic acidosis 5. Urinary tract infection without hematuria, site unspecified 6. Intractable cyclical vomiting with nausea 7. Closed fracture of second lumbar vertebra, unspecified fracture morphology, initial encounter ([facility] [address] 8. Closed compression fracture of third lumbar vertebra, initial encounter ([facility] [address] Attestations This note is prepared by Zenaida Costello, acting as Scribe for MD Nadine Chavarria MD : The scribe's documentation has been prepared under my direction and personally reviewed by me in its entirety. I confirm that the note above accurately reflects all work, treatment, procedures, and medical decision making performed by me. 
 
: 
This note will not be viewable in [address] Shirlene Stout

## 2019-02-27 NOTE — ED NOTES
Patient presents to ED with C/O abd pain, N/V/D,dizziness, and back pain that started a few days ago. The pt denies blood in vomit and stool, and urinary symptoms. Patient is A&Ox4, side rails upx2, call bell w/in reach, and aware of plan of care. The patient is in NAD.

## 2019-02-28 NOTE — PROGRESS NOTES
Spiritual Care Assessment/Progress Note [facility] 
 
 
NAME: Laura Christensen      MRN: [id number] AGE: 58 y.o. SEX: female Confucianist Affiliation: Flako Harrington Language: English  
 
2/28/2019     Total Time (in minutes): 30 Spiritual Assessment begun in [facility] EMERGENCY DEPT through conversation with: 
  
    [x]Patient        [] Family    [] Friend(s) Reason for Consult: Palliative Care, Initial/Spiritual Assessment Spiritual beliefs: (Please include comment if needed) [x] Identifies with a raul tradition:     
   [] Supported by a raul community:        
   [] Claims no spiritual orientation:       
   [] Seeking spiritual identity:            
   [] Adheres to an individual form of spirituality:       
   [] Not able to assess:                   
 
    
Identified resources for coping:  
   [x] Prayer                           
   [] Music                  [] Guided Imagery 
   [] Family/friends                 [] Pet visits [] Devotional reading                         [] Unknown 
   [] Other:                                         
 
 
Interventions offered during this visit: (See comments for more details) Patient Interventions: Affirmation of emotions/emotional suffering, Affirmation of raul, Iconic (affirming the presence of God/Higher Power), Prayer (actual), Prayer (assurance of) Plan of Care: 
 
 [x] Support spiritual and/or cultural needs  
 [] Support AMD and/or advance care planning process    
 [] Support grieving process 
 [] Coordinate Rites and/or Rituals  
 [] Coordination with community clergy 
 [x] No spiritual needs identified at this time 
 [] Detailed Plan of Care below (See Comments)  [] Make referral to Music Therapy 
[] Make referral to Pet Therapy    
[] Make referral to Addiction services 
[] Make referral to [facility] 
[] Make referral to Spiritual Care Partner 
[] No future visits requested [x] Follow up visits as needed Comments: The patient was resting in bed and appeared to be sleeping. I called the patient's name and she slowly responded. The patient's chart indicates serious vision problems and the patient never opened her eyes. The patient listened as I introduced myself and as I explained the purpose of the visit. When I indicated that one support that we provide is prayer the patient stated that she can always use prayer. After permission, I held the patient's hand and prayed. The patient expressed gratitude for the visit. Rev. Mary Raphael EdD MDiv Palliative  Fellow For [facility] Page [phone]

## 2019-02-28 NOTE — CDMP QUERY
Patient admitted with N/V/abd pain, noted to have abnormal serum CO2. If possible, please document in progress notes and d/c summary if you are evaluating and/or treating any of the following:  
 
=> Metabolic Acidosis (POA) 
=> Other explanation of clinical findings  
=> Clinically Undetermined (no explanation for clinical findings) The medical record reflects the following: 
    
Risk Factors: CKD5 Clinical Indicators: Serum CO2 14 Treatment: IVF, monitor labs Thank you, Kelly Good RN 
[phone]

## 2019-02-28 NOTE — ED NOTES
Care assumed from outgoing RN. Pt lying in stretcher NAD. C/o dizziness and nausea. POC glucose checked at 0330 to be 64. Given 7 oz soda. BG to be checked at 0345. S WCTM.

## 2019-02-28 NOTE — PROGRESS NOTES
Initial Nutrition Assessment: 
 
INTERVENTIONS/RECOMMENDATIONS:  
· Meals/Snacks: General/healthful diet: GI Lite + Low phos restriction ASSESSMENT:  
Patient medically noted for nausea/vomiting, abdominal pain, CKD, and vertebral fractures. PMH for HTN and DM. Current weight is \"patient stated\" so unsure of accuracy; indicates a 14% weight loss x 3 months. Patient deemed not a candidate for HD. Nephrology is recommending hospice. Palliative care consulted and had just come out of patient's room at time of attempted visit. Noted issues with nausea/vomiting and need for medications. Patient appeared to be sleeping/resting with eyes closed. Plans for family meeting tomorrow for goals of care discussion. Given active nausea/vomiting, will hold off on ONS for now until able to tolerate PO. Advance diet as tolerated. Will monitor progress and plan of care. Diet Order: (GI lite) % Eaten:  No data found. Pertinent Medications: [x]Reviewed []Other: Norvasc, Humalog Pertinent Labs: [x]Reviewed []Other: Phos 7.9, -[phone] Food Allergies: [x]None []Other Last BM: 2/26   []Active     []Hyperactive  []Hypoactive       [] Absent BS Skin:    [x] Intact   [] Incision  [] Breakdown: [] Edema []Other: Anthropometrics:  
Height: 5' 4\" (162.6 cm) Weight: 54.4 kg (120 lb) IBW (%IBW):   ( ) UBW (%UBW):   (  %) Last Weight Metrics: 
Weight Loss Metrics 2/27/2019 11/19/2018 4/24/2018 4/19/2018 4/16/2018 3/27/2018 9/28/2016 Today's Wt 120 lb 139 lb 15.9 oz - 154 lb 140 lb 9.6 oz 151 lb 0.2 oz 163 lb BMI 20.6 kg/m2 24.03 kg/m2 26.43 kg/m2 - 24.13 kg/m2 25.92 kg/m2 27.98 kg/m2 BMI: Body mass index is 20.6 kg/m². This BMI is indicative of: 
 []Underweight    [x]Normal    []Overweight    [] Obesity   [] Extreme Obesity (BMI>40) Estimated Nutrition Needs (Based on):  
5947 Kcals/day(BMR (1085) x 1. 3AF) , 44 g(-55g (0.8-1.0 g/kg bw)) Protein Carbohydrate: At Least 130 g/day  Fluids: 1400 mL/day (1ml/kcal) Pt expected to meet estimated nutrient needs: [x]Yes []No 
 
NUTRITION DIAGNOSES:  
Problem:  Altered nutrition-related lab values Etiology: related to CKD Signs/Symptoms: as evidenced by Phos 7.9 NUTRITION INTERVENTIONS: 
Meals/Snacks: General/healthful diet GOAL:  
PO intake >50% of meals and phos trend WNL next 2-4 days LEARNING NEEDS (Diet, Food/Nutrient-Drug Interaction):  
 [x] None Identified 
 [] Identified and Education Provided/Documented 
 [] Identified and Pt declined/was not appropriate Cultural, Quaker, OR Ethnic Dietary Needs:  
 [x] None Identified 
 [] Identified and Addressed 
 
 [x] Interdisciplinary Care Plan Reviewed/Documented  
 [x] Discharge Planning:  TBD MONITORING /EVALUATION:  
Food/Nutrient Intake Outcomes: Total energy intake Physical Signs/Symptoms Outcomes: Weight/weight change, Electrolyte and renal profile, Glucose profile, GI profile NUTRITION RISK:  
 [x] High              [] Moderate           []  Low  []  Minimal/Uncompromised PT SEEN FOR:  
 []  MD Consult: []Calorie Count []Diabetic Diet Education []Diet Education []Electrolyte Management []General Nutrition Management and Supplements []Management of Tube Feeding []TPN Recommendations [x]  RN Referral:  [x]MST score >=2 
   []Enteral/Parenteral Nutrition PTA []Pregnant: Gestational DM or Multigestation 
   []Pressure Ulcer/Wound Care needs 
     
[]  Low BMI 
[]  JUNIOR James Pager [phone] Weekend Pager [phone]

## 2019-02-28 NOTE — FORENSIC NURSE
ALEX spoke with Starr Cabrera, RN confirmed ALEX Reynolds spoke to Kelsie Abbott RN on night shift to review patient concerns, At that time pt declined law enforcement and declined forensic exam.

## 2019-02-28 NOTE — CONSULTS
Palliative Medicine Consult [place]: [phone] ([id number]) Patient Name: Heladio Locke YOB: 1955 Date of Initial Consult: 2/28/19 Reason for Consult: end stage disease Requesting Provider: Nomi Alexis Primary Care Physician: Cristiana Ro NP 
 
 SUMMARY:  
Heladio Locke is a 58 y.o. with a past history of dm >84 years complicated , retionpathy (legally blind ), neuropathy and nephropathy, and ESRD refused HD ,  Medical non complaince  HTN  who was admitted on 2/27/2019 from home with a diagnosis of nauea , vomiting due to uremia with worsening renal function . Current medical issues leading to Palliative Medicine involvement include:  Care decision : uremia, metabolic  Encephalopathy , non compliance refused dialysis in past , per nephrology not a candidate for dialysis. Patient is , she has 5 adult children (Niranjan Elise, Italo Chapmanltati Leone, Michele Florez and Divišov). She has been living in [address] [place] [address] , previously living with friend. 
  
 
 
 PALLIATIVE DIAGNOSES:  
1. Goals of care. 2. Metabolic /uremic encephalopathy(intermittent confusion ) 3. Nausea PLAN:  
1. Met with patient , her son Rowan Blackmon and his wife , part of visit combined with Dr Mark Somers, pallaitive care team Trevin Ly present during this meeting ,  And [facility] hospice team  
2. Chart revieewed , patient  mental status is waxing and waning 3. Patient is alert , orioented x 2, breifly, she undersatands, kidneys are failing , we talked about she is not a candidate for dialysis , we discussed hospice , she vocalize understanding of hospice for \"peaceful end\". 4. She gave us permission to talk to her son , drift to sleep. 5. Her son wants \" nature to take its own course \", accepting of hospice service , and hospice team in the room to meet with them after this encounter . 6. Initial consult note routed to primary continuity provider and/or primary health care team members 7. Communicated plan of care with: Palliative IDT, [facility] Team 
 
 GOALS OF CARE / TREATMENT PREFERENCES:  
 
GOALS OF CARE: 
Patient/Health Care Proxy Stated Goals: Comfort TREATMENT PREFERENCES:  
Code Status: Full Code Advance Care Planning: 
[] The [facility] Interdisciplinary Team has updated the ACP Navigator with [place] and Patient Capacity Health Care Agent: Harsh Olmedo - [phone] Advance Care Planning 11/12/2018 Patient's Healthcare Decision Maker is: -  
Primary Decision Maker Relationship to Patient -  
Confirm Advance Directive None Patient Would Like to Complete Advance Directive No  
 
 
   
 
Other Instructions: Other: As far as possible, the palliative care team has discussed with patient / health care proxy about goals of care / treatment preferences for patient. HISTORY:  
 
History obtained from: chart , RN  
 
CHIEF COMPLAINT: intermittent confusion(reviewed Rn notes from today reflecting confusion ) HPI/SUBJECTIVE: The patient is:  
[] Verbal and participatory, intermittent confused and disoriented At the time of visit , patient is alert , oriented to person and place , she denies any pain , tells me nausea is better and drifts back to sleep. Clinical Pain Assessment (nonverbal scale for severity on nonverbal patients):  
Clinical Pain Assessment Severity: 0 Duration: for how long has pt been experiencing pain (e.g., 2 days, 1 month, years) Frequency: how often pain is an issue (e.g., several times per day, once every few days, constant) FUNCTIONAL ASSESSMENT:  
 
Palliative Performance Scale (PPS): PSYCHOSOCIAL/SPIRITUAL SCREENING:  
 
Palliative IDT has assessed this patient for cultural preferences / practices and a referral made as appropriate to needs (Cultural Services, Patient Advocacy, Ethics, etc.) Any spiritual / Christian concerns: 
[] Yes /  [x] No 
 
Caregiver Burnout: 
 [] Yes /  [x] No /  [] No Caregiver Present Anticipatory grief assessment:  
[x] Normal  / [] Maladaptive ESAS Anxiety: ESAS Depression:    
 
Unable to evaluate because of patient factors . REVIEW OF SYSTEMS:  
 
Positive and pertinent negative findings in ROS are noted above in HPI. The following systems were [x] reviewed limited because of patient factors  [] unable to be reviewed as noted in HPI Other findings are noted below. Systems: constitutional, ears/nose/mouth/throat, respiratory, gastrointestinal, genitourinary, musculoskeletal, integumentary, neurologic, psychiatric, endocrine. Positive findings noted below. Modified ESAS Completed by: provider Fatigue: 8 Drowsiness: 1 Pain: 0 Nausea: 0 Anorexia: 3 Dyspnea: 0 Constipation: No  
     
 
 
 PHYSICAL EXAM:  
 
From RN flowsheet: 
Wt Readings from Last 3 Encounters:  
02/27/19 120 lb (54.4 kg) 11/19/18 139 lb 15.9 oz (63.5 kg) 04/16/18 140 lb 9.6 oz (63.8 kg) Blood pressure 137/80, pulse 86, temperature 97.9 °F (36.6 °C), resp. rate 17, height 5' 4\" (1.626 m), weight 120 lb (54.4 kg), SpO2 98 %. Pain Scale 1: Numeric (0 - 10) Pain Intensity 1: 0 Pain Location 1: Abdomen, Back Pain Orientation 1: Left Pain Description 1: Colletta Roup Pain Intervention(s) 1: Medication (see MAR) Last bowel movement, if known:  
 
Constitutional: alert, oriented x2, for brief periods, drifted to sleep . Eyes: pupils equal, anicteric ENMT: no nasal discharge, moist mucous membranes Cardiovascular: regular rhythm, distal pulses intact Respiratory: breathing not labored, symmetric Gastrointestinal: soft non-tender, +bowel sounds Musculoskeletal: no deformity, no tenderness to palpation Skin: warm, dry Neurologic: following commands, moving all extremities, intermittent confusion Psychiatric: unable to evaluaute HISTORY:  
 
Active Problems: 
  DYLAN (acute kidney injury) ([facility] [address] (11/12/2018) Past Medical History:  
Diagnosis Date  Arthritis  Chronic kidney disease 2018  
  stage 5/ not dialysis patient  Chronic pain   
 from arthritis  Diabetes ([facility] [address]   
 no meds  Diabetic neuropathy ([facility] [address]  Diabetic retinopathy ([facility] [address]  HTN (hypertension)  Legally blind Past Surgical History:  
Procedure Laterality Date  HX GYN    
 tubal ligation  VASCULAR SURGERY PROCEDURE UNLIST  4/6/16 Creation of AV fistula right arm Family History Problem Relation Age of Onset  Stroke Mother  Hypertension Mother  Diabetes Father  Cancer Sister  Stroke Sister  Diabetes Brother   
     2 brothers  Diabetes Paternal Grandmother History reviewed, no pertinent family history. Social History Tobacco Use  Smoking status: Never Smoker  Smokeless tobacco: Never Used Substance Use Topics  Alcohol use: No  
 
Allergies Allergen Reactions  Demerol [Meperidine] Swelling Other reaction(s): edema Current Facility-Administered Medications Medication Dose Route Frequency  docusate sodium (COLACE) capsule 100 mg  100 mg Oral BID  
 0.45% sodium chloride infusion  75 mL/hr IntraVENous CONTINUOUS  
 amLODIPine (NORVASC) tablet 10 mg  10 mg Oral DAILY  sodium chloride (NS) flush 5-40 mL  5-40 mL IntraVENous Q8H  
 sodium chloride (NS) flush 5-40 mL  5-40 mL IntraVENous PRN  
 insulin lispro (HUMALOG) injection   SubCUTAneous AC&HS  
 glucose chewable tablet 16 g  4 Tab Oral PRN  
 dextrose (D50W) injection syrg 12.5-25 g  12.5-25 g IntraVENous PRN  
 glucagon (GLUCAGEN) injection 1 mg  1 mg IntraMUSCular PRN  
 ondansetron (ZOFRAN) injection 4 mg  4 mg IntraVENous Q4H PRN  
 heparin (porcine) injection 5,000 Units  5,000 Units SubCUTAneous Q12H  prochlorperazine (COMPAZINE) with saline injection 5 mg  5 mg IntraVENous Q6H PRN  
 sodium bicarbonate tablet 650 mg  650 mg Oral TID  
 
 
 
 LAB AND IMAGING FINDINGS:  
 
 Lab Results Component Value Date/Time WBC 4.3 03/01/2019 02:20 AM  
 HGB 9.5 (L) 03/01/2019 02:20 AM  
 PLATELET 385 26/76/0292 02:20 AM  
 
Lab Results Component Value Date/Time Sodium 142 03/01/2019 02:20 AM  
 Potassium 3.3 (L) 03/01/2019 02:20 AM  
 Chloride 105 03/01/2019 02:20 AM  
 CO2 23 03/01/2019 02:20 AM  
  (H) 03/01/2019 02:20 AM  
 Creatinine 12.90 (H) 03/01/2019 02:20 AM  
 Calcium 7.3 (L) 03/01/2019 02:20 AM  
 Magnesium 2.1 02/28/2019 05:04 AM  
 Phosphorus 7.9 (H) 02/28/2019 05:04 AM  
  
Lab Results Component Value Date/Time AST (SGOT) 16 02/27/2019 04:56 PM  
 Alk. phosphatase 206 (H) 02/27/2019 04:56 PM  
 Protein, total 9.2 (H) 02/27/2019 04:56 PM  
 Albumin 3.9 02/27/2019 04:56 PM  
 Globulin 5.3 (H) 02/27/2019 04:56 PM  
 
Lab Results Component Value Date/Time INR 1.0 11/12/2018 07:21 PM  
 Prothrombin time 10.2 11/12/2018 07:21 PM  
  
Lab Results Component Value Date/Time Iron 52 11/14/2018 03:04 AM  
 TIBC 226 (L) 11/14/2018 03:04 AM  
 Iron % saturation 23 11/14/2018 03:04 AM  
  
No results found for: PH, PCO2, PO2 No components found for: [place] No results found for: CPK, CKMB Total time:  
Counseling / coordination time, spent as noted above:  
> 50% counseling / coordination?:  
 
Prolonged service was provided for  []30 min   []75 min in face to face time in the presence of the patient, spent as noted above. Time Start:  
Time End:  
Note: this can only be billed with 01328 (initial) or 81454 (follow up). If multiple start / stop times, list each separately.

## 2019-02-28 NOTE — CONSULTS
[facility] Name:  Sheryle Alpha 
MR#:  [id number] :  1955 ACCOUNT #:  [de-identified] DATE OF SERVICE:  2019 REFERRING PHYSICIAN:  Dr. Arnulfo Bansal. REASON FOR CONSULTATION:  Renal failure. HISTORY OF PRESENT ILLNESS:  The patient is a 79-year-old [facility] American female with history of hypertension, CKD, diabetes, blindness, who presented from [facility] with complaint of abdominal pain and back pain. The patient was found to have renal failure with BUN of 132 and creatinine of 14.9. The patient has been having nausea and vomiting for the last few days. She has known history of CKD. She has been seen by Dr. Archana Forrest in the past.  She has been noncompliant. She has refused dialysis. She has history of  AV fistula placement by Dr. Howard Bello in the past.  She was seen by me in 2018. During that hospitalization, also she refused dialysis and she refused care. AV fistula placed in . The patient has not been following up with any physician. PAST MEDICAL HISTORY: 1.  CKD stage 5. 
2.  Diabetes. 3.  Arthritis. 4.  Diabetic retinopathy. 5.  Legal blindness. 6.  Hypertension. 7.  Diabetic neuropathy. MEDICATIONS: 
1. Prilosec. 2.  Norvasc. 3.  Zofran. PAST SURGICAL HISTORY: 
1. History of tubal ligation. 2.  Upper arm AV fistula placement in 2016 by Dr. Howard Bello. SOCIAL HISTORY:  Lives in nursing home. No alcohol or drug abuse. FAMILY HISTORY:  Positive for hypertension and stroke to mother, and brother had diabetes. ALLERGIES:  ALLERGIC TO DEMEROL. REVIEW OF SYSTEMS:  As per HPI  Review of systems limited. PHYSICAL EXAMINATION: 
GENERAL:  The patient was lying in the bed, comfortable, not in apparent distress. Alert, awake. VITAL SIGNS:  Temperature 98.6, pulse 91, blood pressure 127/75. NECK:  Supple. No palpable neck mass. LUNGS:  Clear to auscultation bilaterally. No wheezing or crackles. CARDIAC:  Normal S1 and S2.  Regular rate and rhythm. ABDOMEN:  Soft, nontender. Bowel sounds present. EXTREMITIES:  No edema. NEUROLOGIC:  Nonfocal.  Moves all 4 extremities. JOINT:  No joint effusion. SKIN:  No rash. BACK:  No spine tenderness. LABORATORY AND DIAGNOSTIC DATA:  Sodium 136, potassium 4.7, bicarb 17, , creatinine 14.9, calcium 8.5, hemoglobin 11.3. Labs in 11/2018, creatinine 7.5. CT abdomen and pelvis, normal kidneys. No hydronephrosis. Partial destruction and collapse of the L2 vertebral body. EKG, sinus tachycardia. Nonspecific ST-T changes. IMPRESSION: 
1. Acute renal failure. 2.  Chronic kidney disease stage 5. 
3.  Metabolic acidosis. 4.  Hypertension. 5.  History of diabetes. 6.  History of noncompliance. 7.  Anemia. PLAN: 
1. Change IV fluid to IV bicarb. 2.  She is not a dialysis candidate. Consult Palliative Care. Thanks for consultation. We will follow the patient with you. Paulino Matos MD 
 
 
[id number] 
D:  02/28/2019 13:17 
T:  02/28/2019 16:21 
JOB #:  [id number]
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
## 2019-02-28 NOTE — ED NOTES
Called and spoke with Ananya Jeffries RN. Skylar Pang is not on call. Will call another forensics RN.

## 2019-02-28 NOTE — ACP (ADVANCE CARE PLANNING)
Health Care Agent: Adrian [facility] - [phone] AMD on file, only MPOA section completed. Patient is full code.

## 2019-02-28 NOTE — PROGRESS NOTES
Hospitalist Progress Note NAME: Joshua Mendoza :  1955 MRN:  [id number] Assessment / Plan: 
 
N/V, abdominal pain - worsening over the past week - likely secondary to worsening renal function DYLAN on CKD V, not on HD 
 
-BUN/Cr 126/14.1 
-Pt known to Dr. Sean Ponce of Nephrology (will consult them for eval and recs in case of any change in dyalisis or not) apparently has been deemed to not be a dialysis candidate  
-Palliative has seen the pt in the past - as per documentation in DC Summary - Apparently  pt wanted more time to think about Code Status and therefore remained a Full Code 
-Continue  IVF for hydration but watch closely for signs of overload 
-F/u  Palliative eval and recs 
-Anti-emetics PRN, pain meds PRN 
 
ESRD Vertebral Fractures 
-CT Abdo: 1. Partial destruction and collapse of the L2 vertebral body and there is 
approximately 1 cm of osseous retropulsion inferiorly. Central canal measures approximately 4 mm in AP diameter at this level. Compression and partial destruction of the superior aspect of the L3 vertebral body. No associated paraspinal edema. MR can be performed for further evaluation, as indicated. -NSG was consulted by the ER last night and apparently they do not plan for any surgical intervention.  
  
HTN 
DMII Legally Blind 
-FS monitoring, SS 
-Continue Norvasc Underweight/ Body mass index is 20.6 kg/m². Metabolic acidosis Code status: full Prophylaxis: yes Recommended Disposition: Palliative care/Hospice ? Subjective: Chief Complaint / Reason for Physician Visit N/V abdominal pain Review of Systems: 
Symptom Y/N Comments  Symptom Y/N Comments Fever/Chills    Chest Pain Poor Appetite    Edema Cough    Abdominal Pain x Sputum    Joint Pain SOB/NOGUERA    Pruritis/Rash Nausea/vomit x   Tolerating PT/OT Diarrhea    Tolerating Diet Constipation    Other Could NOT obtain due to:   
 
Objective: VITALS:  
Last 24hrs VS reviewed since prior progress note. Most recent are: 
Patient Vitals for the past 24 hrs: 
 Temp Pulse Resp BP SpO2  
02/28/19 0802 97.7 °F (36.5 °C) 90 14 142/85 100 % 02/28/19 0746 97.7 °F (36.5 °C) 91 16 145/85 100 % 02/28/19 0715  88 14 146/81 100 % 02/28/19 0700  87 13 145/78 100 % 02/28/19 0645  87 14 147/83 100 % 02/28/19 0630  86 14 132/85 100 % 02/28/19 0615  87 11 139/84 100 % 02/28/19 0600  85 13 140/79 100 % 02/28/19 0545  87 14 117/81 100 % 02/28/19 0530  87 12 130/81 100 % 02/28/19 0515  88 14 134/76 100 % 02/28/19 0445  91 11 120/77 100 % 02/28/19 0430  95 11 122/77 100 % 02/28/19 0415  99 10 125/76 100 % 02/28/19 0400  97 15 138/85   
02/28/19 0345  95 14 133/73 100 % 02/28/19 0330  97 14 149/89 100 % 02/28/19 0324 98.9 °F (37.2 °C) 96 18 128/85 100 % 02/28/19 0315  94 11 138/75 99 % 02/28/19 0300  94 17 (!) 142/99 100 % 02/28/19 0245  91 11 (!) 145/94 100 % 02/28/19 0230  88 18 131/89 100 % 02/28/19 0215  87 14 112/79 100 % 02/28/19 0200  91 11 136/79 100 % 02/28/19 0145  93 11 139/78 99 % 02/28/19 0130  93 11 (!) 141/95 100 % 02/28/19 0115  94 16 147/89 100 % 02/28/19 0100  89 17 164/82 100 % 02/28/19 0045  86 13 135/80 100 % 02/28/19 0030  84 17 132/83 100 % 02/28/19 0015  90 10 129/83 100 % 02/27/19 2345  93 10 133/75 100 % 02/27/19 2330  90 16 151/79 100 % 02/27/19 2315  89 11 142/77 100 % 02/27/19 2300  88 11 150/75 100 % 02/27/19 2245  87 12 135/78 100 % 02/27/19 2230  87 11 134/75 100 % 02/27/19 2215  85 11 128/78 100 % 02/27/19 2200  86 14 136/78 100 % 02/27/19 2145  94 18 144/83 95 % 02/27/19 2130  86 14 139/78 98 % 02/27/19 2115  87 10 124/75 98 % 02/27/19 2100  87 10 125/84 98 % 02/27/19 2045  85 13 127/73 100 % 02/27/19 2030  84  126/82 98 % 02/27/19 2015  86  119/78 99 % 02/27/19 2000  85  118/76 99 % 02/27/19 1945  98 15 (!) 127/93 100 % 02/27/19 1943  99 19 129/88 100 % 02/27/19 1815  (!) 103 16 143/87 100 % 02/27/19 1800  (!) 102 16 (!) 145/95 100 % 02/27/19 1745  98 16 154/81 100 % 02/27/19 1730  (!) 102 27 154/75 99 % 02/27/19 1715  100 18 146/89 100 % 02/27/19 1648 98.1 °F (36.7 °C) 100 16 (!) 130/96 100 % Intake/Output Summary (Last 24 hours) at 2/28/2019 1113 Last data filed at 2/28/2019 8863 Gross per 24 hour Intake  Output 225 ml Net -225 ml PHYSICAL EXAM: 
General: WD, WN. Alert,chronically ill looking, no acute distress   
EENT:  EOMI. Anicteric sclerae. MMM Resp:  Decreased breath sounds, no wheezing or rales. No accessory muscle use CV:  Regular  rhythm,  No edema GI:  Soft, Non distended, diffuse tenderness.  +Bowel sounds Neurologic:  Alert and oriented X 3, normal speech, Psych:   Good insight. Not anxious nor agitated Skin:  No rashes. No jaundice Reviewed most current lab test results and cultures  YES Reviewed most current radiology test results   YES Review and summation of old records today    NO Reviewed patient's current orders and MAR    YES 
PMH/ reviewed - no change compared to H&P 
________________________________________________________________________ Care Plan discussed with: 
  Comments Patient x Family RN Care Manager Consultant Multidiciplinary team rounds were held today with , nursing, pharmacist and clinical coordinator. Patient's plan of care was discussed; medications were reviewed and discharge planning was addressed. ________________________________________________________________________ Total NON critical care TIME:  35   Minutes Total CRITICAL CARE TIME Spent:   Minutes non procedure based Comments >50% of visit spent in counseling and coordination of care    
________________________________________________________________________ Laura Adams MD  
 
Procedures: see electronic medical records for all procedures/Xrays and details which were not copied into this note but were reviewed prior to creation of Plan. LABS: 
I reviewed today's most current labs and imaging studies. Pertinent labs include: 
Recent Labs  
  02/28/19 
0504 02/27/19 
1656 WBC 5.0 5.2 HGB 9.3* 11.3* HCT 29.8* 35.5  215 Recent Labs  
  02/28/19 
0504 02/27/19 
1656  136  
K 4.1 4.7  104 CO2 14* 17* * 91 * 132* CREA 14.10* 14.90* CA 7.6* 8.5 MG 2.1  --   
PHOS 7.9*  --   
ALB  --  3.9 TBILI  --  0.5 SGOT  --  16 ALT  --  13 Signed: Laura Adams MD

## 2019-02-28 NOTE — H&P
Hospitalist Admission NoteNAME: [employer] :  1955 MRN:  [id number] Date/Time:  2019 10:45 PM 
 
Patient PCP: Keshia Segovia NP 
______________________________________________________________________ Given the patient's current clinical presentation, I have a high level of concern for decompensation if discharged from the emergency department. Complex decision making was performed, which includes reviewing the patient's available past medical records, laboratory results, and x-ray films. My assessment of this patient's clinical condition and my plan of care is as follows. Assessment / Plan: 
N/V, abdominal pain - worsening over the past week - likely secondary to worsening renal function DYLAN on CKD V, not on HD 
-Admit to Telemetry 
-BUN/Cr 132/14.9 
-Pt known to Dr. Brielle Petersen of Nephrology - has been deemed to not be a dialysis candidate  
-Palliative has seen the pt in the past - as per documentation in DC Summary - pt wanted more time to think about Code Status and therefore remained a Full Code 
-Will try IVF for hydration but watch closely for signs of overload 
-Will ask Palliative to see again - pt likely needs to be Hospice 
-Anti-emetics PRN, pain meds PRN Vertebral Fractures 
-CT Abdo: 1. Partial destruction and collapse of the L2 vertebral body and there is 
approximately 1 cm of osseous retropulsion inferiorly. Central canal measures approximately 4 mm in AP diameter at this level. Compression and partial destruction of the superior aspect of the L3 vertebral body. No associated paraspinal edema. MR can be performed for further evaluation, as indicated. -NSG was consulted by the ER and apparently they do not plan for any surgical intervention. HTN 
DMII Legally Blind 
-FS monitoring, SS 
-Continue Norvasc Code Status: Full Code Surrogate Decision Maker: Son 
DVT Prophylaxis: Heparin GI Prophylaxis: not indicated Baseline: Resides at [place] Subjective: CHIEF COMPLAINT: N/V, abdo pain HISTORY OF PRESENT ILLNESS:    
Joy Hernandez is a 58 y.o.  female who presents with CC listed above. Discussed pt's worsening renal function over the past few years and how she had not opted for dialysis before and how she was told that she is not a good candidate either. Pt states that she knows that but wants to have dialysis now. Since her arrival to the ER, she is feeling better. IV nausea medications have helped. She endorses a decreased appetite. She denies any fever, chills, diarrhea, or syncopal episodes. We were asked to admit for work up and evaluation of the above problems. Past Medical History:  
Diagnosis Date  Arthritis  Chronic kidney disease 2018  
  stage 5/ not dialysis patient  Chronic pain   
 from arthritis  Diabetes ([facility] [facility]   
 no meds  Diabetic neuropathy ([facility] [facility]  Diabetic retinopathy ([facility] [facility]  HTN (hypertension)  Legally blind Past Surgical History:  
Procedure Laterality Date  HX GYN    
 tubal ligation  VASCULAR SURGERY PROCEDURE UNLIST  4/6/16 Creation of AV fistula right arm Social History Tobacco Use  Smoking status: Never Smoker  Smokeless tobacco: Never Used Substance Use Topics  Alcohol use: No  
  
 
Family History Problem Relation Age of Onset  Stroke Mother  Hypertension Mother  Diabetes Father  Cancer Sister  Stroke Sister  Diabetes Brother   
     2 brothers  Diabetes Paternal Grandmother Allergies Allergen Reactions  Demerol [Meperidine] Swelling Other reaction(s): edema Prior to Admission medications Medication Sig Start Date End Date Taking? Authorizing Provider  
amLODIPine (NORVASC) 10 mg tablet Take 1 Tab by mouth daily.  11/21/18  Yes Cate Gomez MD  
cloNIDine (CATAPRES) 0.2 mg/24 hr ptwk 1 Patch by TransDERmal route every seven (7) days. 11/26/18  Yes Taz Castro MD  
epoetin jackelyn (EPOGEN;PROCRIT) 10,000 unit/mL injection 1 mL by SubCUTAneous route every Monday, Wednesday, Friday. 11/21/18  Yes Taz Castro MD  
 
 
REVIEW OF SYSTEMS:    
I am not able to complete the review of systems because: The patient is intubated and sedated The patient has altered mental status due to his acute medical problems The patient has baseline aphasia from prior stroke(s) The patient has baseline dementia and is not reliable historian The patient is in acute medical distress and unable to provide information Total of 12 systems reviewed as follows:   
   POSITIVE= underlined text  Negative = text not underlined General:  fever, chills, sweats, generalized weakness, weight loss/gain,  
   loss of appetite Eyes:    blurred vision, eye pain, loss of vision, double vision ENT:    rhinorrhea, pharyngitis Respiratory:   cough, sputum production, SOB, NOGUERA, wheezing, pleuritic pain  
Cardiology:   chest pain, palpitations, orthopnea, PND, edema, syncope Gastrointestinal:  abdominal pain , N/V, diarrhea, dysphagia, constipation, bleeding Genitourinary:  frequency, urgency, dysuria, hematuria, incontinence Muskuloskeletal :  arthralgia, myalgia, back pain Hematology:  easy bruising, nose or gum bleeding, lymphadenopathy Dermatological: rash, ulceration, pruritis, color change / jaundice Endocrine:   hot flashes or polydipsia Neurological:  headache, dizziness, confusion, focal weakness, paresthesia, Speech difficulties, memory loss, gait difficulty Psychological: Feelings of anxiety, depression, agitation Objective: VITALS:   
Visit Vitals /83 Pulse 94 Temp 98.1 °F (36.7 °C) Resp 18 Ht 5' 4\" (1.626 m) Wt 120 lb (54.4 kg) SpO2 95% BMI 20.60 kg/m² PHYSICAL EXAM: 
 
General:    Alert, cooperative, no distress, appears stated age. HEENT: Atraumatic, anicteric sclerae, pink conjunctivae No oral ulcers, mucosa moist, throat clear, dentition fair Neck:  Supple, symmetrical,  thyroid: non tender Lungs:   Clear to auscultation bilaterally. No Wheezing or Rhonchi. No rales. Chest wall:  No tenderness  No Accessory muscle use. Heart:   Regular  rhythm,  No  murmur   No edema Abdomen:   Soft, non-tender. Not distended. Bowel sounds normal 
Extremities: No cyanosis. No clubbing,   
  Skin turgor normal, Capillary refill normal, Radial dial pulse 2+ Skin:     Not pale. Not Jaundiced  No rashes Psych:  Fair insight. Not depressed. Not anxious or agitated. Neurologic: No facial asymmetry. No aphasia or slurred speech. Symmetrical strength, Sensation grossly intact. Alert and oriented X 2 - place and person. _______________________________________________________________________ Care Plan discussed with: 
  Comments Patient x Family RN x Care Manager Consultant:     
_______________________________________________________________________ Expected  Disposition:  
Home with Family HH/PT/OT/RN   
SNF/LTC x  
VAL   
________________________________________________________________________ TOTAL TIME:  55 Minutes Critical Care Provided     Minutes non procedure based Comments  
 x Reviewed previous records  
>50% of visit spent in counseling and coordination of care  Discussion with patient and/or family and questions answered 
  
 
________________________________________________________________________ Signed: Jose Clements MD 
 
Procedures: see electronic medical records for all procedures/Xrays and details which were not copied into this note but were reviewed prior to creation of Plan. LAB DATA REVIEWED:   
Recent Results (from the past 24 hour(s)) CBC WITH AUTOMATED DIFF Collection Time: 02/27/19  4:56 PM  
Result Value Ref Range WBC 5.2 3.6 - 11.0 K/uL RBC 4.24 3.80 - 5.20 M/uL  
 HGB 11.3 (L) 11.5 - 16.0 g/dL HCT 35.5 35.0 - 47.0 % MCV 83.7 80.0 - 99.0 FL  
 MCH 26.7 26.0 - 34.0 PG  
 MCHC 31.8 30.0 - 36.5 g/dL  
 RDW 13.2 11.5 - 14.5 % PLATELET 037 134 - 603 K/uL MPV 11.8 8.9 - 12.9 FL  
 NRBC 0.0 0  WBC ABSOLUTE NRBC 0.00 0.00 - 0.01 K/uL NEUTROPHILS 71 32 - 75 % LYMPHOCYTES 18 12 - 49 % MONOCYTES 8 5 - 13 % EOSINOPHILS 2 0 - 7 % BASOPHILS 1 0 - 1 % IMMATURE GRANULOCYTES 0 0.0 - 0.5 % ABS. NEUTROPHILS 3.7 1.8 - 8.0 K/UL  
 ABS. LYMPHOCYTES 0.9 0.8 - 3.5 K/UL  
 ABS. MONOCYTES 0.4 0.0 - 1.0 K/UL  
 ABS. EOSINOPHILS 0.1 0.0 - 0.4 K/UL  
 ABS. BASOPHILS 0.0 0.0 - 0.1 K/UL  
 ABS. IMM. GRANS. 0.0 0.00 - 0.04 K/UL  
 DF AUTOMATED METABOLIC PANEL, COMPREHENSIVE Collection Time: 02/27/19  4:56 PM  
Result Value Ref Range Sodium 136 136 - 145 mmol/L Potassium 4.7 3.5 - 5.1 mmol/L Chloride 104 97 - 108 mmol/L  
 CO2 17 (L) 21 - 32 mmol/L Anion gap 15 5 - 15 mmol/L Glucose 91 65 - 100 mg/dL  (H) 6 - 20 MG/DL Creatinine 14.90 (H) 0.55 - 1.02 MG/DL  
 BUN/Creatinine ratio 9 (L) 12 - 20 GFR est AA 3 (L) >60 ml/min/1.73m2 GFR est non-AA 2 (L) >60 ml/min/1.73m2 Calcium 8.5 8.5 - 10.1 MG/DL Bilirubin, total 0.5 0.2 - 1.0 MG/DL  
 ALT (SGPT) 13 12 - 78 U/L  
 AST (SGOT) 16 15 - 37 U/L Alk. phosphatase 206 (H) 45 - 117 U/L Protein, total 9.2 (H) 6.4 - 8.2 g/dL Albumin 3.9 3.5 - 5.0 g/dL Globulin 5.3 (H) 2.0 - 4.0 g/dL A-G Ratio 0.7 (L) 1.1 - 2.2 LIPASE Collection Time: 02/27/19  4:56 PM  
Result Value Ref Range Lipase 130 73 - 393 U/L  
EKG, 12 LEAD, INITIAL Collection Time: 02/27/19  5:02 PM  
Result Value Ref Range Ventricular Rate 101 BPM  
 Atrial Rate 101 BPM  
 P-R Interval 144 ms QRS Duration 84 ms Q-T Interval 342 ms QTC Calculation (Bezet) 443 ms Calculated P Axis 57 degrees Calculated R Axis 61 degrees Calculated T Axis 25 degrees Diagnosis Sinus tachycardia Nonspecific T wave abnormality When compared with ECG of 16-APR-2018 10:54, Nonspecific T wave abnormality now evident in Inferior leads Inverted T waves have replaced nonspecific T wave abnormality in Lateral  
leads

## 2019-02-28 NOTE — ED NOTES
Bedside and Verbal shift change report given to Georgina Wilkerson RN(oncoming nurse) by Rahul Wei RN (offgoing nurse). Report included the following information SBAR, Kardex, ED Summary and Recent Results.

## 2019-02-28 NOTE — CDMP QUERY
Patient admitted with N/V/abdominal pain, noted to have abnormal renal labs and documented CKD5. If possible, please document in progress notes and d/c summary if this can be further specified as: 
 
=> ESRD (POA) 
=> Other explanation of clinical findings  
=> Clinically Undetermined (no explanation for clinical findings) The medical record reflects the following: 
    
Risk Factors: N/V; abn labs; hx CKD Clinical Indicators: BUN: 132   Creatinine: 14.90   GFR est non-AA: 2 / GFR est AA: 3; UA with 100 protein 2/28 Nephro: Not a dialysis candidate - Recommend HOSPICE care Treatment: Nephro consult, Hospice/Palliative consult; monitor labs; IVF for hydration but watch closely for signs of overload; anti-emetics Thank you, Janneth Singleton RN 
[phone]

## 2019-02-28 NOTE — PROGRESS NOTES
Visited patient who is alert  , awake in distress, vomiting , unable to have conversation , , denies pain ,  we [facility] Palliative  care , she gave  permission to talk to her son Michele Florez for a family meeting tomorrow to discuss goals of care . Plan :  
 
Advise Rn to give extra 2 mg of Zofran  I/V on top of 4 mg she received 30 mins ago Thank you for including Palliative care in the care of this patient . Bryant Balbuena MD 
 
[phone]

## 2019-02-28 NOTE — CONSULTS
[address] [facility]  LPL:[id number]   [id number]  
 
                [id number] Pt seen and examined 
arf 
ckd Non compliance Plan Not a dialysis candidate Continue ivf Consult palliative care. Recommend HOSPICE care. Rafia Armendariz MD 
[facility] [address] [facility] [place] [address], [address] [place], [address] [facility] Phone - [phone] Fax - [phone] . [address] [facility] Phone - [phone] Fax - [phone]    
www. [facility]

## 2019-02-28 NOTE — PROGRESS NOTES
Palliative Medicine Social Work ADDENDUM 2.28.19, 4:28 PM 
 
Son returned call and left message. When I called back, was unable to leave a voicemail because his voice mailbox was full. Will try again later. 2.28.19,14:45 PM 
 
Dr. Jacinto Lim and I met with patient in her room. She was sick and heaving into a green vomit bag. Dr. Jacinto Lim updated RN to provide antinaseau meds. Patient agreed to palliative calling son/MPOA Lindale Perla and setting up meeting for tomorrow. Waldemar's number out of service Daughter Aleksandra  number out of service Called fiend Ms. Laura Henry (number on chart, patient previously lived with her) and she said she would reach out to [facility] Waldemar's wife to have Lindale Perla call palliative to set up meeting. Thank you for the opportunity to be involved in the care of Ms. [facility] Gini Henriquez, [facility] Palliative Medicine  [phone]

## 2019-03-01 NOTE — HOSPICE
[address] [place] [address] Good Help to Those in Need 
[phone] Inpatient Nursing PRE Admission Patient Name: Maricruz Flores YOB: 1955 Age: 58 y.o. Date of PLANNED Hospice Admission: 3/2/19 Hospice Attending: Dr. Radha Yoo (Simeon Tierney MD) Primary Care Physician: Suman Infante NP Home Hospice Zip Code: [facility] Expected  (if patient transferred to [facility]): TBD ADVANCE CARE PLANNING Code Status: DNR Durable DNR: [x]  Yes  []  No 
Advance Care Planning 2018 Patient's Healthcare Decision Maker is: -  
Primary Decision Maker Relationship to Patient -  
Confirm Advance Directive None Patient Would Like to Complete Advance Directive No  
 
 
Moravian: Cheondoism  Home: TBD HOSPICE SUMMARY  
ER Visits/ Hospitalizations in past year: 4 Hospice Diagnosis: End-stage Renal disease Onset Date of Hospice Diagnosis: 3/2/19 Summary of Disease Progression Leading to Hospice Diagnosis: Iliana García is a 58 y.o.  female who presents with  Abdominal pain and worsening kidney function. Discussed pt's worsening renal function over the past few years and how she had not opted for dialysis before and how she was told that she is not a good candidate either. Co-Morbidities:  
Patient Active Problem List  
Diagnosis Code  Diabetic retinopathy ([address] E11.319  
 Diabetic neuropathy (HCC) E11.40  
 HTN (hypertension) I10  
 Chronic kidney disease N18.9  DYLAN (acute kidney injury) ([facility] [address] N17.9 Diagnoses RELATED to the terminal prognosis: ESRD, HTN, DM Other Diagnoses: See Above Rationale for a prognosis of life expectancy of 6 months or less if the disease follows its normal course (Disease Specific History):  
 
Maricruz Flores is a 58 y.o. who was admitted to [address] [place] [address]. The patient's principle diagnosis of end-stage renal disease has resulted in altered mental status.   Functionally, the patient's Palliative Performance Scale has declined over a period of days and is estimated at 20-30. Objective information that support this patients limited prognosis includes: Creatinine 14.67 and ESRD and worsening encephalopathy . The patient/family chose comfort measures with the support of Hospice. Patient meets for Routine LOC as evidenced by stable vitals and is without distress at this time. Verbal certification of terminal diagnosis with life expectancy of 6 months or less received from: Dr. Elizabeth Granados (facility MD) Will sign CTI on Monday when he returns to the facility. May use on call MD for needs prior to Monday's date. Prognosis estimated based on 03/01/19 clinical assessment is:  
[] Few to Many Hours [] Hours to Days [x] Few to Many Days  
[] Days to Weeks  
[] Few to Many Weeks  
[] Weeks to Months  
[] Few to Many Months CLINICAL INFORMATION Allergies: Allergies Allergen Reactions  Demerol [Meperidine] Swelling Other reaction(s): edema Wt Readings from Last 3 Encounters:  
02/27/19 54.4 kg (120 lb)  
11/19/18 63.5 kg (139 lb 15.9 oz) 04/16/18 63.8 kg (140 lb 9.6 oz) Ht Readings from Last 3 Encounters:  
02/27/19 5' 4\" (1.626 m)  
11/19/18 5' 4\" (1.626 m)  
04/16/18 5' 4\" (1.626 m) Body mass index is 20.6 kg/m². Visit Vitals /67 (BP 1 Location: Left arm, BP Patient Position: At rest) Pulse 93 Temp 98.6 °F (37 °C) Resp 17 Ht 5' 4\" (1.626 m) Wt 54.4 kg (120 lb) SpO2 98% BMI 20.60 kg/m² LAB VALUES No results found for this visit on 02/27/19 (from the past 12 hour(s)). No results found for this visit on 02/27/19 (from the past 6 hour(s)). Lab Results Component Value Date/Time Protein, total 9.2 (H) 02/27/2019 04:56 PM  
 Albumin 3.9 02/27/2019 04:56 PM  
 
 
Currently this patient has: 
[] Supplemental O2 [x] Peripheral IV  [] PICC    [] PORT  
[] Del Castillo Catheter [] NG Tube   [] PEG Tube [] Ostomy [] AICD: Has ICD been deactivated? [] Yes [] No:______ Progression to DEPENDENCE WITH ADLs (include time frame): X  Dependent for bathing: personal hygiene and grooming X Dependent for dressing: dressing and undressing X Dependent for transferring: movement and mobility X Dependent for toileting: continence-related tasks including control and hygiene X Dependent for eating: preparing food and feeding ASSESSMENT & PLAN 1. Symptom Issues Identified: Confusion, delusions, periods of agitation with encephalopathy. 2. Spiritual Issues Identified: None needed at kori of assessment. 3.  Psych/ Social/ Emotional Issues Identified: None noted at time of assessment. 4.  Care Coordination:  
Transfer to: [address] [place] [address] Report given to: Thai Coats RN; Dr. Jaya Naik (facility MD) notified of admit and pending CTI. Transportation by: [facility] Scheduled for 3/2/19 btw [id number] Medications Needs: None at time of assessment DME: O2 concentrator and mattress overlay. Deepali's equipment Comp #: [id number] to be delivered on 3/2/19 before 1400. Supplies: Per facility

## 2019-03-01 NOTE — PROGRESS NOTES
Palliative Medicine Family Meeting Participants:  Patient, family (son/MPOROBERT Judge, [facility]); Palliative medicine (Ofelia Monteiro, Dr. Anjali Kiser); Hospice (Harris Caicedo RN) Discussion:   
 
Dr. Anjali Kiser and I met with patient and her family in room. Hospice RN and LCSW also present. Patient was lying in bed, appearing ill but also acknowledging people in room when prompted (she is legally blind so unable to make eye contact)/ Her mentation has been waxing and waning and during this meeting, she was alert for the first part and then drifted off. 
 
1. During time when patient alert, discussed with her what doctors are saying: declining kidney function, not dialysis candidate, not expected to recover, comfort/hospice is recommended. Patient was able to repeat back this Information and said she understands what hospice is; \"it's what helps people die. \" 2. Patient agreed to palliative/hospice talking to son/MPOROBERT about her care and at this time she drifted off to sleep. Reviewed information with son, and discussed that hospice liaisons would provide more detailed information about hospice service at this time. Outcome/Plan:   
 
Patient and family agreed to hospice consult. VICK [facility]ur Palliative Medicine:  [phone])

## 2019-03-01 NOTE — PROGRESS NOTES
Palliative Medicine Social Work Chart reveiwed. Acknowledge and appreciate NARDA Escobar note with PANCHO Mercer's number. Family meeting set with son and Barbara Zapata for 2:30 PM today. Thank you for the opportunity to be involved in the care of Ms. Cecille Gasca and her family. Crystal Vela, [facility] Palliative Medicine  [phone]

## 2019-03-01 NOTE — HOSPICE
[employer] Good Help to Those in Need 
[phone] Patient Name: Miranda Ford YOB: 1955 Age: 58 y.o. [employer] RN Note:  Hospice consult received, reviewing chart. Will follow up with Unit Nurse and Care Manager to discuss plan of care, patient status and discharge disposition within the hour. Family meeting is scheduled with Palliative and Dr. Ronaldo Lassiter today at [phone]. Hospice Liaison called and spoke with Eddy Weeks Primary RN and discussed plan for hospice involvement after meeting. Pt lives at [facility] and is not an HD candidate outpatient. Family is considering hospice at facility. Will follow-up after meeting with Palliative. 1515: Met with family with ZI Flaherty, Dr. Chloe Spaulding and Braden Colunga and discussed Hospice and services. POC discussed and family wishes to return patient to [facility] under hospice care. Dr. Manuel Raza updated and notified of D/C to [facility] between 1400-1500pm on 3/2/19 with admission to [facility] between [id number]. Spoke with Pat in intake and updated for weekend triage. 1615: 10 Sandra  @ [facility] with update. 1641: Called to call Wing 1 @ [facility] for questions regarding equipment needs and CTI with attending MD Dr. Nick Noland. Spoke with RN charge nurse Farhan Martinez and verified needs and with ETA to facility on 3/2/19. Verbalized understanding. 1705: Alessandra Hicks called and profiled in system. Spoke with Theo Dunlap. 1713: 1103 Georgia Johnston called for equipment order; O2 concentrator and set up and mattress overlay. Spoke with Khanh Sharp Comp#: [id number]. Thank you for the opportunity to be of service to this patient.

## 2019-03-01 NOTE — ROUTINE PROCESS
Bedside and Verbal shift change report given to [facility], RN (oncoming nurse) by Miranda Gomez RN (offgoing nurse). Report included the following information SBAR, Intake/Output and MAR.

## 2019-03-01 NOTE — PROGRESS NOTES
Palliative Medicine Social Work Family meeting was set for 2:30. No family in room and RN Shonna Trujillo said son/MPOA had not been told about meeting as of 12:30 pm. 
 
Called son at this time and unable to leave a message. Called Cecy Salas (daughter in law) and left message. Thank you for the opportunity to be involved in the care of Ms. Malorie Keane. Scout Calloway, [facility] Palliative Medicine  [phone]
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
## 2019-03-01 NOTE — PROGRESS NOTES
Pt has desire to no longer participate in dialysis  She has a lumbar compression fx that would ordinarily require a fusion with decompression but she is now a hospice pt  Therefore we will not consider any surgery for her at this time  Will re eval on request  Perhaps a lumbar brace would help for support  Thank you

## 2019-03-01 NOTE — PROGRESS NOTES
Hospice consult placed to [employer] as per order. 1400  Palliative Care consult set for 1430. I'll send chart to [facility] to assess for readmission possibly with hospice if needed. She would need [facility] transport. Call report to [phone], send emar, d/c instructions, completed DDNR, facesheet, ambulance form, and Rx for narcotics if any if discharged over the weekend.

## 2019-03-01 NOTE — PROGRESS NOTES
Nephrology Progress Note Kyree Paige  
 
www. [facility]                  Phone - [phone] Patient: Bradford Jacob Date- 3/1/2019 Admit Date: 2/27/2019 YOB: 1955 CC: Follow up for arf on ckd Subjective: Interval History:  
-  k low Acidosis improved 
bp stable Plan for family meeting today C/o nausea No sob No fever ROS:- as above Assessment: 1. Acute renal failure. 2.  Chronic kidney disease stage 5. 
3.  Metabolic acidosis. 4.  Hypertension. 5.  History of diabetes. 6.  History of noncompliance. 7.  Anemia. 8. hypokalemia Plan:  
· Stop iv bicarb · Start . 45 nacl · kcl po · Po bicarb · SHE IS NOT A DIALYSIS CANDIDATE. SHE HAS declined dialysis in past multiple time · Palliative care meeting with family today. Physical Exam:  
GEN: NAD NECK- Supple, no mass RESP: Clear b/l, no wheezing, No accessory muscle use CVS: RRR,S1,S2 ABDO: soft , non tender, No mass EXT: No Edema NEURO: normal speech, non focal 
 
Care Plan discussed with: pt 
Objective:  
Patient Vitals for the past 24 hrs: 
 Temp Pulse Resp BP SpO2  
03/01/19 0734 98.1 °F (36.7 °C) 86 18 144/81 99 % 03/01/19 0308 97.9 °F (36.6 °C) 85 18 145/66 100 % 02/28/19 2331 98 °F (36.7 °C) 86 18 132/68 100 % 02/28/19 2012 98.3 °F (36.8 °C) 89 18 143/75 100 % 02/28/19 1345 97.9 °F (36.6 °C) 97 18 152/72 100 % 02/28/19 1230 98.6 °F (37 °C) 91 24 127/75 100 % Last 3 Recorded Weights in this Encounter 02/27/19 1648 Weight: 54.4 kg (120 lb)  
 
02/27 1901 - 03/01 0700 In: 868.3 [P.O.:120; I.V.:748.3] Out: 225 [Urine:225] Chart reviewed. Pertinent Notes reviewed. Medication list  reviewed Current Facility-Administered Medications Medication  docusate sodium (COLACE) capsule 100 mg  potassium chloride SR (KLOR-CON 10) tablet 20 mEq  0.45% sodium chloride infusion  amLODIPine (NORVASC) tablet 10 mg  
 sodium chloride (NS) flush 5-40 mL  sodium chloride (NS) flush 5-40 mL  insulin lispro (HUMALOG) injection  glucose chewable tablet 16 g  
 dextrose (D50W) injection syrg 12.5-25 g  
 glucagon (GLUCAGEN) injection 1 mg  ondansetron (ZOFRAN) injection 4 mg  heparin (porcine) injection 5,000 Units  prochlorperazine (COMPAZINE) with saline injection 5 mg  sodium bicarbonate tablet 650 mg  
 sodium bicarbonate 150 mEq/1000 mL D5W (premix) Data Review : 
Recent Labs 03/01/19 
0220 02/28/19 
8181 02/27/19 
1656 WBC 4.3 5.0 5.2 HGB 9.5* 9.3* 11.3*  
 193 215 ANEU  --  3.4 3.7  138 136  
K 3.3* 4.1 4.7 * 161* 91 * 126* 132* CREA 12.90* 14.10* 14.90* ALT  --   --  13 SGOT  --   --  16  
TBILI  --   --  0.5 AP  --   --  206* CA 7.3* 7.6* 8.5 MG  --  2.1  --   
PHOS  --  7.9*  --   
 
Lab Results Component Value Date/Time Culture result: GRAM NEGATIVE RODS (A) 02/28/2019 01:10 AM  
 Culture result: (A) 02/28/2019 01:10 AM  
  STREPTOCOCCI, BETA HEMOLYTIC GROUP B Penicillin and ampicillin are drugs of choice for treatment of beta-hemolytic streptococcal infections. Susceptibility testing of penicillins and beta-lactams approved by the FDA for treatment of beta-hemolytic streptococcal infections need not be performed routinely, because nonsusceptible isolates are extremely rare. CLSI 2012 Culture result: NO SIGNIFICANT GROWTH 11/13/2018 04:04 AM  
 
No results found for: SDES No results for input(s): FE, TIBC, PSAT, FERR in the last 72 hours. No results found for: Asim Ingram MD 
[facility] 
 www. [facility] / [employer] [address] [place], [address] Phone - [phone] Fax - [phone]

## 2019-03-01 NOTE — PROGRESS NOTES
Pts. Daughter in law Corazon Moreira called to check on pt. Pt. Gave verbal consent that I could speak with daughter in law (caller). Corazon's phone number as follows: [phone]. *[phone] - Waldemar's new phone number. Ascencion Guthrie would like to be present during meeting and would be at work tomorrow but would like for Dalia Hawkins to call back in AM Jefferson Goyoferny left her a message with new numbers as well). 0310: Pt. Requesting a stool softener. States she normally takes docusate sodium. Will message on call MD  
 
0700: Bedside shift change report given to [facility] (oncoming nurse) by Andrew Hernandez (offgoing nurse). Report included the following information SBAR, Kardex, Procedure Summary, Intake/Output, MAR and Recent Results. *unable to complete admission database due to pt. Periodic confusion and nausea.
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
## 2019-03-02 NOTE — PROGRESS NOTES
Report called into [facility] to [facility] LPN. Report consisted of SBAR, MAR and recent results. An opportunity for questions and clarification was provided for

## 2019-03-02 NOTE — PROGRESS NOTES
Weekend CM reviewed medical record, followed up re: schedule discharge today. Patient's transitional care plan remains to return to [facility], Nursing to call report to [phone]. CM confirmed [employer] will admit to service. Nursing to arrange discharge transportation w/ [phone]. CM remains available to further assist w/ transitional care plans as indicated. Stephanie Magdaleno, RAYW Justus Lopez

## 2019-03-02 NOTE — PROGRESS NOTES
Hospice and family at the bedside. 0000 Verbal shift change report given to Cate Goldsmith RN (oncoming nurse) by Edgardo Viramontes (offgoing nurse). Report included the following information SBAR, Kardex, Intake/Output, MAR and Recent Results.

## 2019-03-02 NOTE — PROGRESS NOTES
Hospitalist Progress Note NAME: Shelton Mejía :  1955 MRN:  [id number] Assessment / Plan: 
 
N/V, abdominal pain - worsening over the past week - likely secondary to worsening renal function DYLAN on CKD V, not on HD Metabolic encephalopathy The son is POA had a family meeting who rec comfort care will proceed to return her to [facility] [place] by [facility]  With Hospice tomorrow 
 
-Continue  IVF for hydration  
-Anti-emetics PRN, pain meds PRN 
 
ESRD Vertebral Fractures 
--NSG was consulted by the ER  apparently they do not plan for any surgical intervention.  
  
HTN 
DMII Legally Blind 
-FS monitoring, SS 
-Continue Norvasc Underweight/ Body mass index is 20.6 kg/m². Metabolic acidosis Code status: Comfort care Prophylaxis: yes Subjective: Chief Complaint / Reason for Physician Visit N/V abdominal pain Review of Systems: 
Symptom Y/N Comments  Symptom Y/N Comments Fever/Chills    Chest Pain Poor Appetite    Edema Cough    Abdominal Pain x Sputum    Joint Pain SOB/NOGUERA    Pruritis/Rash Nausea/vomit x   Tolerating PT/OT Diarrhea    Tolerating Diet Constipation    Other Could NOT obtain due to:   
 
Objective: VITALS:  
Last 24hrs VS reviewed since prior progress note. Most recent are: 
Patient Vitals for the past 24 hrs: 
 Temp Pulse Resp BP SpO2  
19 1906 97.7 °F (36.5 °C) 91 17 141/75 98 % 19 1641 98.6 °F (37 °C) 93 17 137/67 98 % 19 1100 97.9 °F (36.6 °C) 86 17 137/80 98 % 19 0734 98.1 °F (36.7 °C) 86 18 144/81 99 % 19 0308 97.9 °F (36.6 °C) 85 18 145/66 100 % 19 2331 98 °F (36.7 °C) 86 18 132/68 100 % 19 2012 98.3 °F (36.8 °C) 89 18 143/75 100 % Intake/Output Summary (Last 24 hours) at 3/1/2019 1927 Last data filed at 3/1/2019 1645 Gross per 24 hour Intake 748.33 ml Output 1130 ml Net -381.67 ml PHYSICAL EXAM: 
 General: WD, WN. Alert,chronically ill looking, no acute distress   
EENT:  EOMI. Anicteric sclerae. MMM Resp:  Decreased breath sounds, no wheezing or rales. No accessory muscle use CV:  Regular  rhythm,  No edema GI:  Soft, Non distended, diffuse tenderness.  +Bowel sounds Neurologic:  Alert and oriented X 3, normal speech, Psych:   Good insight. Not anxious nor agitated Skin:  No rashes. No jaundice Reviewed most current lab test results and cultures  YES Reviewed most current radiology test results   YES Review and summation of old records today    NO Reviewed patient's current orders and MAR    YES 
PMH/SH reviewed - no change compared to H&P 
________________________________________________________________________ Care Plan discussed with: 
  Comments Patient x Family RN Care Manager Consultant Multidiciplinary team rounds were held today with , nursing, pharmacist and clinical coordinator. Patient's plan of care was discussed; medications were reviewed and discharge planning was addressed. ________________________________________________________________________ Total NON critical care TIME:  35   Minutes Total CRITICAL CARE TIME Spent:   Minutes non procedure based Comments >50% of visit spent in counseling and coordination of care    
________________________________________________________________________ Arley Torres MD  
 
Procedures: see electronic medical records for all procedures/Xrays and details which were not copied into this note but were reviewed prior to creation of Plan. LABS: 
I reviewed today's most current labs and imaging studies. Pertinent labs include: 
Recent Labs 03/01/19 
0220 02/28/19 
0309 02/27/19 
1656 WBC 4.3 5.0 5.2 HGB 9.5* 9.3* 11.3* HCT 29.3* 29.8* 35.5  193 215 Recent Labs 03/01/19 
0220 02/28/19 
3671 02/27/19 
1656  138 136  
K 3.3* 4.1 4.7  107 104 CO2 23 14* 17* * 161* 91 * 126* 132* CREA 12.90* 14.10* 14.90* CA 7.3* 7.6* 8.5 MG  --  2.1  --   
PHOS  --  7.9*  --   
ALB  --   --  3.9 TBILI  --   --  0.5 SGOT  --   --  16 ALT  --   --  13 Signed: Kamlesh Beckford MD

## 2019-03-03 NOTE — HOSPICE
[employer] RN consultation visit note. Order for hospice noted. History and events of this hospitalization reviewed. Events of the previous 24 hours reviewed In to visit with patient and family of daughter Mode Hernandez. Support given. TC to son/mpoa Oli Baird said he would not be able to make it to [facility] today and he met me at [facility] and signed consents. Report given to admit nurse at [facility] GREEN. Consents and Prescription faxed to [facility] for Morphine prn as ordered. Please call [phone] if questions or concerns Chapis Taveras RN [facility]

## 2022-05-10 NOTE — ED NOTES
Estephanie Suárez MD at the bedside updating the pt. Pt is dry heaving.  Discussed with Estephaine Suárez MD. 
 No